# Patient Record
Sex: FEMALE | Race: OTHER | HISPANIC OR LATINO | URBAN - METROPOLITAN AREA
[De-identification: names, ages, dates, MRNs, and addresses within clinical notes are randomized per-mention and may not be internally consistent; named-entity substitution may affect disease eponyms.]

---

## 2020-01-27 ENCOUNTER — INPATIENT (INPATIENT)
Facility: HOSPITAL | Age: 75
LOS: 3 days | Discharge: HOME | DRG: 280 | End: 2020-01-31
Attending: HOSPITALIST | Admitting: STUDENT IN AN ORGANIZED HEALTH CARE EDUCATION/TRAINING PROGRAM
Payer: MEDICAID

## 2020-01-27 VITALS
RESPIRATION RATE: 17 BRPM | SYSTOLIC BLOOD PRESSURE: 109 MMHG | DIASTOLIC BLOOD PRESSURE: 68 MMHG | OXYGEN SATURATION: 95 % | HEART RATE: 145 BPM

## 2020-01-27 DIAGNOSIS — I21.4 NON-ST ELEVATION (NSTEMI) MYOCARDIAL INFARCTION: ICD-10-CM

## 2020-01-27 LAB
ALBUMIN SERPL ELPH-MCNC: 3.5 G/DL — SIGNIFICANT CHANGE UP (ref 3.3–5)
ALBUMIN SERPL ELPH-MCNC: 3.9 G/DL — SIGNIFICANT CHANGE UP (ref 3.3–5)
ALP SERPL-CCNC: 100 U/L — SIGNIFICANT CHANGE UP (ref 40–120)
ALP SERPL-CCNC: 94 U/L — SIGNIFICANT CHANGE UP (ref 40–120)
ALT FLD-CCNC: 16 U/L — SIGNIFICANT CHANGE UP (ref 10–45)
ALT FLD-CCNC: 18 U/L — SIGNIFICANT CHANGE UP (ref 10–45)
ANION GAP SERPL CALC-SCNC: 14 MMOL/L — SIGNIFICANT CHANGE UP (ref 5–17)
ANION GAP SERPL CALC-SCNC: 19 MMOL/L — HIGH (ref 5–17)
APTT BLD: 32.6 SEC — SIGNIFICANT CHANGE UP (ref 27.5–36.3)
AST SERPL-CCNC: 62 U/L — HIGH (ref 10–40)
AST SERPL-CCNC: 77 U/L — HIGH (ref 10–40)
BILIRUB SERPL-MCNC: 1.2 MG/DL — SIGNIFICANT CHANGE UP (ref 0.2–1.2)
BILIRUB SERPL-MCNC: 1.3 MG/DL — HIGH (ref 0.2–1.2)
BLD GP AB SCN SERPL QL: NEGATIVE — SIGNIFICANT CHANGE UP
BUN SERPL-MCNC: 22 MG/DL — SIGNIFICANT CHANGE UP (ref 7–23)
BUN SERPL-MCNC: 26 MG/DL — HIGH (ref 7–23)
CALCIUM SERPL-MCNC: 8.7 MG/DL — SIGNIFICANT CHANGE UP (ref 8.4–10.5)
CALCIUM SERPL-MCNC: 9.2 MG/DL — SIGNIFICANT CHANGE UP (ref 8.4–10.5)
CHLORIDE SERPL-SCNC: 103 MMOL/L — SIGNIFICANT CHANGE UP (ref 96–108)
CHLORIDE SERPL-SCNC: 99 MMOL/L — SIGNIFICANT CHANGE UP (ref 96–108)
CK MB BLD-MCNC: 4.2 % — HIGH (ref 0–3.5)
CK MB CFR SERPL CALC: 7.8 NG/ML — HIGH (ref 0–3.8)
CK SERPL-CCNC: 187 U/L — HIGH (ref 25–170)
CO2 SERPL-SCNC: 18 MMOL/L — LOW (ref 22–31)
CO2 SERPL-SCNC: 21 MMOL/L — LOW (ref 22–31)
CREAT SERPL-MCNC: 1.07 MG/DL — SIGNIFICANT CHANGE UP (ref 0.5–1.3)
CREAT SERPL-MCNC: 1.09 MG/DL — SIGNIFICANT CHANGE UP (ref 0.5–1.3)
GAS PNL BLDV: SIGNIFICANT CHANGE UP
GLUCOSE SERPL-MCNC: 111 MG/DL — HIGH (ref 70–99)
GLUCOSE SERPL-MCNC: 135 MG/DL — HIGH (ref 70–99)
HBA1C BLD-MCNC: 5.4 % — SIGNIFICANT CHANGE UP (ref 4–5.6)
HCT VFR BLD CALC: 37.9 % — SIGNIFICANT CHANGE UP (ref 34.5–45)
HCT VFR BLD CALC: 40.8 % — SIGNIFICANT CHANGE UP (ref 34.5–45)
HGB BLD-MCNC: 12.6 G/DL — SIGNIFICANT CHANGE UP (ref 11.5–15.5)
HGB BLD-MCNC: 13.3 G/DL — SIGNIFICANT CHANGE UP (ref 11.5–15.5)
INR BLD: 2.08 RATIO — HIGH (ref 0.88–1.16)
MAGNESIUM SERPL-MCNC: 1.8 MG/DL — SIGNIFICANT CHANGE UP (ref 1.6–2.6)
MCHC RBC-ENTMCNC: 30.3 PG — SIGNIFICANT CHANGE UP (ref 27–34)
MCHC RBC-ENTMCNC: 30.4 PG — SIGNIFICANT CHANGE UP (ref 27–34)
MCHC RBC-ENTMCNC: 32.6 GM/DL — SIGNIFICANT CHANGE UP (ref 32–36)
MCHC RBC-ENTMCNC: 33.2 GM/DL — SIGNIFICANT CHANGE UP (ref 32–36)
MCV RBC AUTO: 91.3 FL — SIGNIFICANT CHANGE UP (ref 80–100)
MCV RBC AUTO: 92.9 FL — SIGNIFICANT CHANGE UP (ref 80–100)
NRBC # BLD: 0 /100 WBCS — SIGNIFICANT CHANGE UP (ref 0–0)
NRBC # BLD: 0 /100 WBCS — SIGNIFICANT CHANGE UP (ref 0–0)
PHOSPHATE SERPL-MCNC: 3.4 MG/DL — SIGNIFICANT CHANGE UP (ref 2.5–4.5)
PLATELET # BLD AUTO: 226 K/UL — SIGNIFICANT CHANGE UP (ref 150–400)
PLATELET # BLD AUTO: 269 K/UL — SIGNIFICANT CHANGE UP (ref 150–400)
POTASSIUM SERPL-MCNC: 3.6 MMOL/L — SIGNIFICANT CHANGE UP (ref 3.5–5.3)
POTASSIUM SERPL-MCNC: 4 MMOL/L — SIGNIFICANT CHANGE UP (ref 3.5–5.3)
POTASSIUM SERPL-SCNC: 3.6 MMOL/L — SIGNIFICANT CHANGE UP (ref 3.5–5.3)
POTASSIUM SERPL-SCNC: 4 MMOL/L — SIGNIFICANT CHANGE UP (ref 3.5–5.3)
PROT SERPL-MCNC: 6.7 G/DL — SIGNIFICANT CHANGE UP (ref 6–8.3)
PROT SERPL-MCNC: 7.3 G/DL — SIGNIFICANT CHANGE UP (ref 6–8.3)
PROTHROM AB SERPL-ACNC: 24.3 SEC — HIGH (ref 10–12.9)
RBC # BLD: 4.15 M/UL — SIGNIFICANT CHANGE UP (ref 3.8–5.2)
RBC # BLD: 4.39 M/UL — SIGNIFICANT CHANGE UP (ref 3.8–5.2)
RBC # FLD: 14.5 % — SIGNIFICANT CHANGE UP (ref 10.3–14.5)
RBC # FLD: 14.6 % — HIGH (ref 10.3–14.5)
RH IG SCN BLD-IMP: POSITIVE — SIGNIFICANT CHANGE UP
SODIUM SERPL-SCNC: 136 MMOL/L — SIGNIFICANT CHANGE UP (ref 135–145)
SODIUM SERPL-SCNC: 138 MMOL/L — SIGNIFICANT CHANGE UP (ref 135–145)
TROPONIN T, HIGH SENSITIVITY RESULT: 881 NG/L — HIGH (ref 0–51)
TROPONIN T, HIGH SENSITIVITY RESULT: 883 NG/L — HIGH (ref 0–51)
TROPONIN T, HIGH SENSITIVITY RESULT: 960 NG/L — HIGH (ref 0–51)
TSH SERPL-MCNC: 1.01 UIU/ML — SIGNIFICANT CHANGE UP (ref 0.27–4.2)
WBC # BLD: 10.71 K/UL — HIGH (ref 3.8–10.5)
WBC # BLD: 12.45 K/UL — HIGH (ref 3.8–10.5)
WBC # FLD AUTO: 10.71 K/UL — HIGH (ref 3.8–10.5)
WBC # FLD AUTO: 12.45 K/UL — HIGH (ref 3.8–10.5)

## 2020-01-27 PROCEDURE — 70544 MR ANGIOGRAPHY HEAD W/O DYE: CPT | Mod: 26

## 2020-01-27 PROCEDURE — 99223 1ST HOSP IP/OBS HIGH 75: CPT | Mod: GC

## 2020-01-27 PROCEDURE — 70450 CT HEAD/BRAIN W/O DYE: CPT | Mod: 26

## 2020-01-27 PROCEDURE — 71045 X-RAY EXAM CHEST 1 VIEW: CPT | Mod: 26

## 2020-01-27 PROCEDURE — 99291 CRITICAL CARE FIRST HOUR: CPT

## 2020-01-27 RX ORDER — NITROGLYCERIN 6.5 MG
10 CAPSULE, EXTENDED RELEASE ORAL
Qty: 50 | Refills: 0 | Status: DISCONTINUED | OUTPATIENT
Start: 2020-01-27 | End: 2020-01-27

## 2020-01-27 RX ORDER — CHLORHEXIDINE GLUCONATE 213 G/1000ML
1 SOLUTION TOPICAL
Refills: 0 | Status: DISCONTINUED | OUTPATIENT
Start: 2020-01-27 | End: 2020-01-28

## 2020-01-27 RX ORDER — ASPIRIN/CALCIUM CARB/MAGNESIUM 324 MG
325 TABLET ORAL ONCE
Refills: 0 | Status: COMPLETED | OUTPATIENT
Start: 2020-01-27 | End: 2020-01-27

## 2020-01-27 RX ORDER — DILTIAZEM HCL 120 MG
240 CAPSULE, EXT RELEASE 24 HR ORAL DAILY
Refills: 0 | Status: DISCONTINUED | OUTPATIENT
Start: 2020-01-28 | End: 2020-01-31

## 2020-01-27 RX ORDER — ATORVASTATIN CALCIUM 80 MG/1
80 TABLET, FILM COATED ORAL AT BEDTIME
Refills: 0 | Status: DISCONTINUED | OUTPATIENT
Start: 2020-01-27 | End: 2020-01-31

## 2020-01-27 RX ORDER — HEPARIN SODIUM 5000 [USP'U]/ML
INJECTION INTRAVENOUS; SUBCUTANEOUS
Qty: 25000 | Refills: 0 | Status: DISCONTINUED | OUTPATIENT
Start: 2020-01-27 | End: 2020-01-28

## 2020-01-27 RX ORDER — DOCUSATE SODIUM 100 MG
1 CAPSULE ORAL
Qty: 0 | Refills: 0 | DISCHARGE

## 2020-01-27 RX ORDER — DILTIAZEM HCL 120 MG
5 CAPSULE, EXT RELEASE 24 HR ORAL
Qty: 125 | Refills: 0 | Status: DISCONTINUED | OUTPATIENT
Start: 2020-01-27 | End: 2020-01-27

## 2020-01-27 RX ORDER — ASPIRIN/CALCIUM CARB/MAGNESIUM 324 MG
81 TABLET ORAL DAILY
Refills: 0 | Status: DISCONTINUED | OUTPATIENT
Start: 2020-01-28 | End: 2020-01-31

## 2020-01-27 RX ORDER — METOPROLOL TARTRATE 50 MG
12.5 TABLET ORAL ONCE
Refills: 0 | Status: DISCONTINUED | OUTPATIENT
Start: 2020-01-27 | End: 2020-01-27

## 2020-01-27 RX ORDER — METOPROLOL TARTRATE 50 MG
25 TABLET ORAL
Refills: 0 | Status: DISCONTINUED | OUTPATIENT
Start: 2020-01-27 | End: 2020-01-29

## 2020-01-27 RX ORDER — CLOPIDOGREL BISULFATE 75 MG/1
75 TABLET, FILM COATED ORAL DAILY
Refills: 0 | Status: DISCONTINUED | OUTPATIENT
Start: 2020-01-28 | End: 2020-01-29

## 2020-01-27 RX ORDER — OMEPRAZOLE 10 MG/1
1 CAPSULE, DELAYED RELEASE ORAL
Qty: 0 | Refills: 0 | DISCHARGE

## 2020-01-27 RX ADMIN — ATORVASTATIN CALCIUM 80 MILLIGRAM(S): 80 TABLET, FILM COATED ORAL at 21:06

## 2020-01-27 RX ADMIN — Medication 3 MICROGRAM(S)/MIN: at 14:41

## 2020-01-27 RX ADMIN — Medication 325 MILLIGRAM(S): at 20:14

## 2020-01-27 RX ADMIN — HEPARIN SODIUM 650 UNIT(S)/HR: 5000 INJECTION INTRAVENOUS; SUBCUTANEOUS at 20:14

## 2020-01-27 RX ADMIN — Medication 5 MG/HR: at 12:56

## 2020-01-27 NOTE — CONSULT NOTE ADULT - SUBJECTIVE AND OBJECTIVE BOX
HPI:  74 year old female presenting to the ED as a transfer from [place] for cardiac eval, found to have RUE weakness, LKN is 1/26 in the AM    PAST MEDICAL & SURGICAL HISTORY:    FAMILY HISTORY:       General:  Constitutional: Obese Female, appears stated age, in no apparent distress including pain  Head: Normocephalic & atraumatic.  ENT: Patent ear canals, intact TM, mucus membranes moist & pink, neck supple, no lymphadenopathy.   Respiratory: Patent airway. All lung fields are clear to auscultation bilaterally.  Extremities: No cyanosis, clubbing, or edema.  Skin: No rashes, bruising, or discoloration.    Cardiovascular (>2): RRR no murmurs. Carotid pulsations symmetric, no bruits. Normal capillary beds refill, 1-2 seconds or less.     Neurological (>12):  MS: Awake, alert, oriented to person, place, situation, time. Normal affect. Follows all commands.    Language: Speech is clear, fluent with good repetition & comprehension (able to name objects___)    CNs: PERRLA (R = 3mm, L = 3mm). VFF. EOMI no nystagmus, no diplopia. V1-3 intact to LT/pinprick, well developed masseter muscles b/l. No facial asymmetry b/l, full eye closure strength b/l. Hearing grossly normal (rubbing fingers) b/l. Symmetric palate elevation in midline. Gag reflex deferred. Head turning & shoulder shrug intact b/l. Tongue midline, normal movements, no atrophy.    Fundoscopic: pale w/ sharp discs margins No vascular changes.      Motor: Normal muscle bulk & tone. No noticeable tremor or seizure. No pronator drift.              Deltoid	Biceps	Triceps	Wrist	Finger ABd	   R	5	5	5	5	5		5 	  L	5	5	5	5	5		5    	H-Flex	H-Ext	H-ABd	H-ADd	K-Flex	K-Ext	D-Flex	P-Flex  R	5	5	5	5	5	5	5	5 	   L	5	5	5	5	5	5	5	5	     Sensation: Intact to LT/PP/Temp/Vibration/Position b/l throughout.     Cortical: Extinction on DSS (neglect): none    Reflexes:              Biceps(C5)       BR(C6)     Triceps(C7)               Patellar(L4)    Achilles(S1)    Plantar Resp  R	2	          2	             2		        2		    2		Down   L	2	          2	             2		        2		    2		Down     Coordination: intact rapid-alt movements. No dysmetria to FTN/HTS    Gait: Normal Romberg. No postural instability. Normal stance and tandem gait.     LABORATORY:  CBC                       13.3   12.45 )-----------( 269      ( 27 Jan 2020 12:55 )             40.8     Chem 01-27    136  |  99  |  22  ----------------------------<  135<H>  3.6   |  18<L>  |  1.09    Ca    9.2      27 Jan 2020 12:55    TPro  7.3  /  Alb  3.9  /  TBili  1.3<H>  /  DBili  x   /  AST  77<H>  /  ALT  18  /  AlkPhos  100  01-27    LFTs LIVER FUNCTIONS - ( 27 Jan 2020 12:55 )  Alb: 3.9 g/dL / Pro: 7.3 g/dL / ALK PHOS: 100 U/L / ALT: 18 U/L / AST: 77 U/L / GGT: x           Coagulopathy   Lipid Panel   A1c   Cardiac enzymes     U/A HPI:  74 year old Czech speaking female with no known PMHx who  presented to the ED as a transfer from [place] for cardiac eval (troponin 17 per ED). She presented to [place] initially for RUE weakness, was noted by family to have almost fallen out of bed, likely due to RLE weakness, though RUE > RLE weakness. She is able to do all ADLs otherwise. She presented to the ED on a nitro drip and heparin drip, and received cardizem and plavix at [place]. She was also tachycardic to the 160s on arrival. CT head at [place] was reportedly unremarkable. No head trauma recently, no illnesses.   NIHSS 1 (drift), MRS 0    General:  Constitutional: Female, appears stated age, in no apparent distress including pain  Head: Normocephalic & atraumatic.    Neurological (>12):  MS: Awake, alert, oriented to person, place, situation, time. Normal affect. Follows all commands.  Language: Speech is clear, fluent with good repetition & comprehension    CNs: PERRL. EOMI no nystagmus, no diplopia. V1-3 intact to LT. well developed masseter muscles b/l. No facial asymmetry b/l, full eye closure strength b/l.     Motor: Normal muscle bulk & tone. No noticeable tremor or seizure. + RUE pronator drift              Deltoid	Biceps	Triceps     R	4	4	4	4		  L	5	5	5	5    	H-Flex	H-ExtK-Flex	K-Ext	D-Flex	P-Flex  R	5	5	5	5	5	5	   L	5	5	5	5	5	5		     Sensation: Intact to LT throughout.     Coordination:  + dysmetria in RUE  Gait: deferred    LABORATORY:  CBC                       13.3   12.45 )-----------( 269      ( 27 Jan 2020 12:55 )             40.8     Chem 01-27    136  |  99  |  22  ----------------------------<  135<H>  3.6   |  18<L>  |  1.09    Ca    9.2      27 Jan 2020 12:55    TPro  7.3  /  Alb  3.9  /  TBili  1.3<H>  /  DBili  x   /  AST  77<H>  /  ALT  18  /  AlkPhos  100  01-27    LFTs LIVER FUNCTIONS - ( 27 Jan 2020 12:55 )  Alb: 3.9 g/dL / Pro: 7.3 g/dL / ALK PHOS: 100 U/L / ALT: 18 U/L / AST: 77 U/L / GGT: x

## 2020-01-27 NOTE — ED ADULT NURSE REASSESSMENT NOTE - NS ED NURSE REASSESS COMMENT FT1
Patient was on continued drip of Nitro and Heparin from OSH. Heparin stopped at 14:40 as per MD instructions that came from Neuro. Will continue to hold until further notice.

## 2020-01-27 NOTE — H&P ADULT - NSHPPHYSICALEXAM_GEN_ALL_CORE
PHYSICAL EXAM:  Constitutional: WAF  Respiratory: WNL  Cardiovascular: S1, S2 WNL  Gastrointestinal: abd soft, NT  Extremities: warm, dry  Vascular: + DP/PT  Neurological: A and O x 4, RUE weakness   Skin: dry and intact  Psychiatric: normal affect

## 2020-01-27 NOTE — ED PROVIDER NOTE - OBJECTIVE STATEMENT
Pt is a 75 y/o female with PMH gastritis, AF, HTN, aortic anuerysm ( last measured at 4.5), CAD is transferred from  for NSTEMI and CCU evaluation. Pt presented 1/26 to  with R arm weakness and leg weakness, was admitted, developed AF with RVR and chest pain. Had elevated troponin to 17. CTA neg for PE. Pt transferred on NTG and Heparin drip. Pt previously on cardizem drip which was d/c-ed prior to transfer.

## 2020-01-27 NOTE — CONSULT NOTE ADULT - ASSESSMENT
74 year old Albanian speaking female with no known PMHx who  presented to the ED as a transfer from [place] for cardiac eval (troponin 17 per ED). She presented to [place] initially for RUE weakness, was noted by family to have almost fallen out of bed, likely due to RLE weakness, though RUE > RLE weakness. She is able to do all ADLs otherwise. She presented to the ED on a nitro drip and heparin drip, and received cardizem and plavix at [place]. She was also tachycardic to the 160s on arrival. CT head at [place] was reportedly unremarkable. No head trauma recently, no illnesses.   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

## 2020-01-27 NOTE — H&P ADULT - NSICDXPASTMEDICALHX_GEN_ALL_CORE_FT
PAST MEDICAL HISTORY:  Atrial fibrillation     CAD (coronary artery disease)     Gastritis     HLD (hyperlipidemia)

## 2020-01-27 NOTE — H&P ADULT - ASSESSMENT
77 year old F w/ pmh of HTN, HLD, aortic aneurysm, AF who presented to [facility] w/ HTN, Af w/ RVR and new RUE weakness concerning for stroke complicated by chest pain w/ positive EKGs consistent w/ NSTEMI    Stroke  - Continue DAPT  - MRI brain  -PT/OT  - Neuro check Q 45    NSTEMI  - ECHO revealing normal LV, likely demand in the setting of AF w/ RVR  - Continue to trend CE  - Aspirin load, continue plavix and heparin, ok with Neuro    Af w/ RVR  - Continue oral cardizem and BB  Now in NSr

## 2020-01-27 NOTE — ED ADULT NURSE NOTE - OBJECTIVE STATEMENT
74y female arrived to ED as transfer from [facility] for cardiac evaluation. Patient arrived to [facility] with Nitro drip set at 10mcg/min and heparin drip set at 650 units/hour. As per EMS, patient did NOT receive Heparin bolus, ASA, Brilinta PTA. Patient received Cardizem (unknown amount), Plavix 675mg PTA according to EMS. Patient A&Ox4, ambulatory at baseline, lives at home with family. Patient tachycardic 160s on arrival, O2 is 92% on room air, improved to 95% with 2L nasal canula. 74y female arrived to ED as transfer from [facility] for cardiac evaluation. Patient arrived to [facility] with Nitro drip set at 10mcg/min and heparin drip set at 650 units/hour. As per EMS, patient did NOT receive Heparin bolus, ASA, Brilinta PTA. Patient received Cardizem (unknown amount), Plavix 675mg PTA according to EMS. Patient A&Ox4, ambulatory at baseline, lives at home with family. Patient tachycardic 160s on arrival, O2 is 92% on room air, improved to 95% with 2L nasal canula. Patient denies pain at this time. Primarily Ukrainian speaking.

## 2020-01-27 NOTE — H&P ADULT - HISTORY OF PRESENT ILLNESS
74 year old Azeri speaking female with PMHx of gastritis, AF, HTN, aortic anuerysm ( last measured at 4.5), CAD  who presented from home on 1/26 with R arm weakness while eating breakfast and her "legs giving out". Pt also had elevated BP, nausea, vomiting and epigastric abdominal pain. In the ED a CTH was negative and INR 2.2 therapeutic. Neurology came to evaluate the patient who stated no TPA was necessary for RUE weakness and suspicion for CVA. On  1/27 pt developed  AF w/ RVR with HR in the 200s and was  complaining of substernal chest pain w/ stable blood pressure in the 130s. Cardizem gtt was started and nitro was given x 3.  EKG showed TWI in V3, V4. CT angio  was negative for aortic dissection. She was found to have elevated Troponin to 17 and was transferred to [facility] for possible cardiac cath She presented to the ED on a nitro drip and heparin drip, and received cardizem and plavix at [place]. She was also tachycardic to the 160s on arrival. CT head at [place] was reportedly unremarkable however the repeat head CT showed possible L lacunar infarct.     Upon admission to CCU 2  pt had no more chest pain and HR was sinus rhythm 80s.

## 2020-01-27 NOTE — ED ADULT NURSE NOTE - NSIMPLEMENTINTERV_GEN_ALL_ED
Implemented All Fall with Harm Risk Interventions:  [place] to call system. Call bell, personal items and telephone within reach. Instruct patient to call for assistance. Room bathroom lighting operational. Non-slip footwear when patient is off stretcher. Physically safe environment: no spills, clutter or unnecessary equipment. Stretcher in lowest position, wheels locked, appropriate side rails in place. Provide visual cue, wrist band, yellow gown, etc. Monitor gait and stability. Monitor for mental status changes and reorient to person, place, and time. Review medications for side effects contributing to fall risk. Reinforce activity limits and safety measures with patient and family. Provide visual clues: red socks.

## 2020-01-27 NOTE — ED PROVIDER NOTE - CRITICAL CARE PROVIDED
consultation with other physicians/consult w/ pt's family directly relating to pts condition/direct patient care (not related to procedure)/documentation/additional history taking

## 2020-01-27 NOTE — CONSULT NOTE ADULT - ATTENDING COMMENTS
Impression:? Left hemispheric dysfunction likely deep arterial territory involvement based on neurological exam. I agree with the above assessment and plan per resident note and recommend MRI brain without contrast. If patient is on anticoagulation, dual antiplatelet is not necessary from stroke prevention standpoint, however would defer decision to cardiology/medical team.

## 2020-01-27 NOTE — CHART NOTE - NSCHARTNOTEFT_GEN_A_CORE
====================  CCU MIDNIGHT ROUNDS  ====================    BRADEN MORA  [id number]    ====================  SUMMARY: HPI:  74 year old Setswana speaking female with PMHx of gastritis, AF, HTN, aortic anuerysm ( last measured at 4.5), CAD  who presented from home on 1/26 with R arm weakness while eating breakfast and her "legs giving out". Pt also had elevated BP, nausea, vomiting and epigastric abdominal pain. In the ED a CTH was negative and INR 2.2 therapeutic. Neurology came to evaluate the patient who stated no TPA was necessary for RUE weakness and suspicion for CVA. On  1/27 pt developed  AF w/ RVR with HR in the 200s and was  complaining of substernal chest pain w/ stable blood pressure in the 130s. Cardizem gtt was started and nitro was given x 3.  EKG showed TWI in V3, V4. CT angio  was negative for aortic dissection. She was found to have elevated Troponin to 17 and was transferred to [facility] for possible cardiac cath She presented to the ED on a nitro drip and heparin drip, and received cardizem and plavix at [place]. She was also tachycardic to the 160s on arrival. CT head at [place] was reportedly unremarkable however the repeat head CT showed possible L lacunar infarct.     Upon admission to CCU 2  pt had no more chest pain and HR was sinus rhythm 80s. (27 Jan 2020 17:02)    ====================        ====================  NEW EVENTS:  MRA head non-con to be completed tonight.   Remains CP free, no complaints. No neuro changes.   ====================        ====================  VITALS (Last 12 hrs):  ====================    T(C): 36.9 (01-27-20 @ 18:22), Max: 37 (01-27-20 @ 13:17)  HR: 102 (01-27-20 @ 21:00) (71 - 145)  BP: 132/66 (01-27-20 @ 21:00) (82/51 - 132/66)  BP(mean): 91 (01-27-20 @ 21:00) (61 - 91)  RR: 21 (01-27-20 @ 21:00) (17 - 31)  SpO2: 98% (01-27-20 @ 21:00) (93% - 98%)      TELEMETRY: sinus         I&O's Summary    27 Jan 2020 07:01  -  27 Jan 2020 22:01  --------------------------------------------------------  IN: 6.5 mL / OUT: 0 mL / NET: 6.5 mL        ====================  PLAN:  # CVA  - head CT suspicious for left lacunar infarct  - neuro checks q4  - heparin gtt and dapt cleared by neuro  - MRA head non con pending   - PT OT evals     # NSTEMI  - likely demand in the setting of CVA  - continue heparin gtt and DAPT (ok w/ neuro)  - cont statin   - cont home BB   - trend CE and EKGs   - poss ischemic workup pending clinical course   - TTE revealing preserved EF, no WMAs, no PFO, normal RVSF     # Afib   - hold coumadin, cont heparin gtt  - cont home BB and CCB   - monitor lytes   ====================    HEALTH ISSUES - PROBLEM Dx:      Taya Ruiz, CCU PA #[id number]

## 2020-01-27 NOTE — H&P ADULT - NSHPREVIEWOFSYSTEMS_GEN_ALL_CORE
REVIEW OF SYSTEMS  General:	denies   Skin/Breast:	denies  Ophthalmologic:	denies  ENMT:	denies  Respiratory and Thorax:	denies  Cardiovascular:	S1,S2 WNL   Gastrointestinal:	denies  Genitourinary:	denies  Musculoskeletal:	denies  Neurological:	denies  Psychiatric:	denies  Hematology/Lymphatics:	denies  Endocrine:	denies  Allergic/Immunologic:	denies

## 2020-01-27 NOTE — ED PROVIDER NOTE - CLINICAL SUMMARY MEDICAL DECISION MAKING FREE TEXT BOX
JORGE ALBERTO Mora MD: Pt is a 73 y/o female with PMH gastritis, AF, HTN, aortic anuerysm ( last measured at 4.5), CAD is transferred from  for NSTEMI and rapid AFib for CCU eval. Plan: repeat labs including cardiac labs, ekg, cxr. Will restart cardizem drip as pt is in rapid AFib with RVR. D/w Cardiology who recommends Neuro consult given recent CVA to see whether pt can be placed on blood thinners for possible cardiac cath. Neuro and full Cards consults appreciated

## 2020-01-27 NOTE — ED PROVIDER NOTE - SEVERE SEPSIS ALERT DETAILS
pt has AF, demand ischemia, HR elevated 2/2 this- will be admitted for NSTEMI, also with recent stroke

## 2020-01-28 ENCOUNTER — TRANSCRIPTION ENCOUNTER (OUTPATIENT)
Age: 75
End: 2020-01-28

## 2020-01-28 DIAGNOSIS — I63.9 CEREBRAL INFARCTION, UNSPECIFIED: ICD-10-CM

## 2020-01-28 DIAGNOSIS — I48.91 UNSPECIFIED ATRIAL FIBRILLATION: ICD-10-CM

## 2020-01-28 DIAGNOSIS — I21.4 NON-ST ELEVATION (NSTEMI) MYOCARDIAL INFARCTION: ICD-10-CM

## 2020-01-28 LAB
ALBUMIN SERPL ELPH-MCNC: 3.5 G/DL — SIGNIFICANT CHANGE UP (ref 3.3–5)
ALP SERPL-CCNC: 91 U/L — SIGNIFICANT CHANGE UP (ref 40–120)
ALT FLD-CCNC: 14 U/L — SIGNIFICANT CHANGE UP (ref 10–45)
ANION GAP SERPL CALC-SCNC: 13 MMOL/L — SIGNIFICANT CHANGE UP (ref 5–17)
APTT BLD: 108.8 SEC — HIGH (ref 27.5–36.3)
APTT BLD: 59.1 SEC — HIGH (ref 27.5–36.3)
APTT BLD: 95.8 SEC — HIGH (ref 27.5–36.3)
AST SERPL-CCNC: 54 U/L — HIGH (ref 10–40)
BILIRUB SERPL-MCNC: 1 MG/DL — SIGNIFICANT CHANGE UP (ref 0.2–1.2)
BUN SERPL-MCNC: 30 MG/DL — HIGH (ref 7–23)
CALCIUM SERPL-MCNC: 8.9 MG/DL — SIGNIFICANT CHANGE UP (ref 8.4–10.5)
CHLORIDE SERPL-SCNC: 102 MMOL/L — SIGNIFICANT CHANGE UP (ref 96–108)
CHOLEST SERPL-MCNC: 238 MG/DL — HIGH (ref 10–199)
CK MB BLD-MCNC: 3.4 % — SIGNIFICANT CHANGE UP (ref 0–3.5)
CK MB CFR SERPL CALC: 4.9 NG/ML — HIGH (ref 0–3.8)
CK SERPL-CCNC: 145 U/L — SIGNIFICANT CHANGE UP (ref 25–170)
CO2 SERPL-SCNC: 22 MMOL/L — SIGNIFICANT CHANGE UP (ref 22–31)
CREAT SERPL-MCNC: 1.22 MG/DL — SIGNIFICANT CHANGE UP (ref 0.5–1.3)
GLUCOSE SERPL-MCNC: 112 MG/DL — HIGH (ref 70–99)
HCT VFR BLD CALC: 38.2 % — SIGNIFICANT CHANGE UP (ref 34.5–45)
HDLC SERPL-MCNC: 59 MG/DL — SIGNIFICANT CHANGE UP
HGB BLD-MCNC: 12.7 G/DL — SIGNIFICANT CHANGE UP (ref 11.5–15.5)
INR BLD: 2.11 RATIO — HIGH (ref 0.88–1.16)
LACTATE SERPL-SCNC: 1.8 MMOL/L — SIGNIFICANT CHANGE UP (ref 0.7–2)
LIPID PNL WITH DIRECT LDL SERPL: 160 MG/DL — HIGH
MAGNESIUM SERPL-MCNC: 1.9 MG/DL — SIGNIFICANT CHANGE UP (ref 1.6–2.6)
MCHC RBC-ENTMCNC: 30.4 PG — SIGNIFICANT CHANGE UP (ref 27–34)
MCHC RBC-ENTMCNC: 33.2 GM/DL — SIGNIFICANT CHANGE UP (ref 32–36)
MCV RBC AUTO: 91.4 FL — SIGNIFICANT CHANGE UP (ref 80–100)
NRBC # BLD: 0 /100 WBCS — SIGNIFICANT CHANGE UP (ref 0–0)
PHOSPHATE SERPL-MCNC: 2.9 MG/DL — SIGNIFICANT CHANGE UP (ref 2.5–4.5)
PLATELET # BLD AUTO: 227 K/UL — SIGNIFICANT CHANGE UP (ref 150–400)
POTASSIUM SERPL-MCNC: 3.9 MMOL/L — SIGNIFICANT CHANGE UP (ref 3.5–5.3)
POTASSIUM SERPL-SCNC: 3.9 MMOL/L — SIGNIFICANT CHANGE UP (ref 3.5–5.3)
PROT SERPL-MCNC: 6.7 G/DL — SIGNIFICANT CHANGE UP (ref 6–8.3)
PROTHROM AB SERPL-ACNC: 24.6 SEC — HIGH (ref 10–12.9)
RBC # BLD: 4.18 M/UL — SIGNIFICANT CHANGE UP (ref 3.8–5.2)
RBC # FLD: 14.6 % — HIGH (ref 10.3–14.5)
SODIUM SERPL-SCNC: 137 MMOL/L — SIGNIFICANT CHANGE UP (ref 135–145)
TOTAL CHOLESTEROL/HDL RATIO MEASUREMENT: 4 RATIO — SIGNIFICANT CHANGE UP (ref 3.3–7.1)
TRIGL SERPL-MCNC: 94 MG/DL — SIGNIFICANT CHANGE UP (ref 10–149)
TROPONIN T, HIGH SENSITIVITY RESULT: 912 NG/L — HIGH (ref 0–51)
WBC # BLD: 9.94 K/UL — SIGNIFICANT CHANGE UP (ref 3.8–10.5)
WBC # FLD AUTO: 9.94 K/UL — SIGNIFICANT CHANGE UP (ref 3.8–10.5)

## 2020-01-28 PROCEDURE — 99233 SBSQ HOSP IP/OBS HIGH 50: CPT | Mod: GC

## 2020-01-28 PROCEDURE — 93010 ELECTROCARDIOGRAM REPORT: CPT

## 2020-01-28 PROCEDURE — 99223 1ST HOSP IP/OBS HIGH 75: CPT

## 2020-01-28 PROCEDURE — 70551 MRI BRAIN STEM W/O DYE: CPT | Mod: 26

## 2020-01-28 RX ORDER — MAGNESIUM SULFATE 500 MG/ML
1 VIAL (ML) INJECTION ONCE
Refills: 0 | Status: COMPLETED | OUTPATIENT
Start: 2020-01-28 | End: 2020-01-28

## 2020-01-28 RX ORDER — HEPARIN SODIUM 5000 [USP'U]/ML
500 INJECTION INTRAVENOUS; SUBCUTANEOUS
Qty: 25000 | Refills: 0 | Status: DISCONTINUED | OUTPATIENT
Start: 2020-01-28 | End: 2020-01-30

## 2020-01-28 RX ORDER — POTASSIUM CHLORIDE 20 MEQ
20 PACKET (EA) ORAL ONCE
Refills: 0 | Status: COMPLETED | OUTPATIENT
Start: 2020-01-28 | End: 2020-01-28

## 2020-01-28 RX ORDER — HEPARIN SODIUM 5000 [USP'U]/ML
3200 INJECTION INTRAVENOUS; SUBCUTANEOUS ONCE
Refills: 0 | Status: DISCONTINUED | OUTPATIENT
Start: 2020-01-28 | End: 2020-01-30

## 2020-01-28 RX ORDER — HEPARIN SODIUM 5000 [USP'U]/ML
3200 INJECTION INTRAVENOUS; SUBCUTANEOUS EVERY 6 HOURS
Refills: 0 | Status: DISCONTINUED | OUTPATIENT
Start: 2020-01-28 | End: 2020-01-30

## 2020-01-28 RX ADMIN — Medication 25 MILLIGRAM(S): at 11:02

## 2020-01-28 RX ADMIN — Medication 81 MILLIGRAM(S): at 13:21

## 2020-01-28 RX ADMIN — CHLORHEXIDINE GLUCONATE 1 APPLICATION(S): 213 SOLUTION TOPICAL at 05:43

## 2020-01-28 RX ADMIN — HEPARIN SODIUM 650 UNIT(S)/HR: 5000 INJECTION INTRAVENOUS; SUBCUTANEOUS at 03:44

## 2020-01-28 RX ADMIN — ATORVASTATIN CALCIUM 80 MILLIGRAM(S): 80 TABLET, FILM COATED ORAL at 21:05

## 2020-01-28 RX ADMIN — Medication 100 GRAM(S): at 05:52

## 2020-01-28 RX ADMIN — CLOPIDOGREL BISULFATE 75 MILLIGRAM(S): 75 TABLET, FILM COATED ORAL at 13:21

## 2020-01-28 RX ADMIN — HEPARIN SODIUM 500 UNIT(S)/HR: 5000 INJECTION INTRAVENOUS; SUBCUTANEOUS at 19:41

## 2020-01-28 RX ADMIN — Medication 240 MILLIGRAM(S): at 05:53

## 2020-01-28 RX ADMIN — HEPARIN SODIUM 0 UNIT(S)/HR: 5000 INJECTION INTRAVENOUS; SUBCUTANEOUS at 14:54

## 2020-01-28 RX ADMIN — Medication 25 MILLIGRAM(S): at 00:52

## 2020-01-28 RX ADMIN — Medication 20 MILLIEQUIVALENT(S): at 05:53

## 2020-01-28 NOTE — PHYSICAL THERAPY INITIAL EVALUATION ADULT - DISCHARGE DISPOSITION, PT EVAL
home/home w/ assist/home w/ home PT/home with home PT for improved strength balance & endurance for activity, recommend RW for ambulation at this time, supervision/assist from family as needed

## 2020-01-28 NOTE — PHYSICAL THERAPY INITIAL EVALUATION ADULT - ADDITIONAL COMMENTS
Pt resides in apt with son, 6 steps to enter, one level within. PTA independent with mobility and ADL's, owns no DME.

## 2020-01-28 NOTE — OCCUPATIONAL THERAPY INITIAL EVALUATION ADULT - PRECAUTIONS/LIMITATIONS, REHAB EVAL
On  1/27 pt developed  AF w/ RVR with HR in the 200s and was  complaining of substernal chest pain w/ stable blood pressure in the 130s. Cardizem gtt was started and nitro was given x 3.  EKG showed TWI in V3, V4. CT angio  was negative for aortic dissection. She was found to have elevated Troponin to 17 and was transferred to [facility] for possible cardiac cath She presented to the ED on a nitro drip and heparin drip, and received cardizem and plavix at [place]. She was also tachycardic to the 160s on arrival. CT head at [place] was reportedly unremarkable however the repeat head CT showed possible L lacunar infarct. Upon admission to CCU 2  pt had no more chest pain and HR was sinus rhythm 80s. fall precautions/On  1/27 pt developed  AF w/ RVR with HR in the 200s and was  complaining of substernal chest pain w/ stable blood pressure in the 130s. Cardizem gtt was started and nitro was given x 3.  EKG showed TWI in V3, V4. CT angio  was negative for aortic dissection. She was found to have elevated Troponin to 17 and was transferred to [facility] for possible cardiac cath She presented to the ED on a nitro drip and heparin drip, and received cardizem and plavix at [place]. She was also tachycardic to the 160s on arrival. CT head at [place] was reportedly unremarkable however the repeat head CT showed possible L lacunar infarct. Upon admission to CCU 2  pt had no more chest pain and HR was sinus rhythm 80s.

## 2020-01-28 NOTE — CHART NOTE - NSCHARTNOTEFT_GEN_A_CORE
HPI:  74 year old Khmer speaking female with PMHx of gastritis, AF, HTN, aortic anuerysm ( last measured at 4.5), CAD  who presented from home on 1/26 with R arm weakness while eating breakfast and her "legs giving out". Pt also had elevated BP, nausea, vomiting and epigastric abdominal pain. In the ED a CTH was negative and INR 2.2 therapeutic. Neurology came to evaluate the patient who stated no TPA was necessary for RUE weakness and suspicion for CVA. On  1/27 pt developed  AF w/ RVR with HR in the 200s and was  complaining of substernal chest pain w/ stable blood pressure in the 130s. Cardizem gtt was started and nitro was given x 3.  EKG showed TWI in V3, V4. CT angio  was negative for aortic dissection. She was found to have elevated Troponin to 17 and was transferred to [facility] for possible cardiac cath She presented to the ED on a nitro drip and heparin drip, and received cardizem and plavix at [place]. She was also tachycardic to the 160s on arrival. CT head at [place] was reportedly unremarkable however the repeat head CT showed possible L lacunar infarct.   Upon admission to CCU 2  pt had no more chest pain and HR was sinus rhythm 80s. (27 Jan 2020 17:02)  CCU Course: pt was continued on hep gtt for afib, HR 40's- 50's, asymptomatic, c/w ASA, Plavix, Lipitor, lopressor on hold due to bradycardia HR 40's-50's, neurology team following, s/p MRI pending results and clearance for eventual cath.     ICU Vital Signs Last 24 Hrs  T(C): 37.1 (28 Jan 2020 19:00), Max: 37.2 (28 Jan 2020 06:00)  T(F): 98.8 (28 Jan 2020 19:00), Max: 98.9 (28 Jan 2020 06:00)  HR: 52 (28 Jan 2020 22:00) (52 - 148)  BP: 102/65 (28 Jan 2020 22:00) (81/59 - 141/81)  BP(mean): 78 (28 Jan 2020 22:00) (65 - 102)  RR: 22 (28 Jan 2020 22:00) (22 - 42)  SpO2: 96% (28 Jan 2020 22:00) (92% - 98%)    TELEMETRY: afib 40's - 50's     MEDICATIONS  (STANDING):  aspirin enteric coated 81 milliGRAM(s) Oral daily  atorvastatin 80 milliGRAM(s) Oral at bedtime  chlorhexidine 4% Liquid 1 Application(s) Topical <User Schedule>  clopidogrel Tablet 75 milliGRAM(s) Oral daily  diltiazem    milliGRAM(s) Oral daily  heparin  Infusion. 500 Unit(s)/Hr (5 mL/Hr) IV Continuous <Continuous>  heparin  Injectable 3200 Unit(s) IV Push once  metoprolol tartrate 25 milliGRAM(s) Oral two times a day    MEDICATIONS  (PRN):  heparin  Injectable 3200 Unit(s) IV Push every 6 hours PRN For aPTT less than 40      FOLLOW:  #neuro  CVA, lacunar infarct dx at [facility]   - neuro checks Q4hrs  - s/p MRI Head pending results    #card  NSTEMI (non-ST elevated myocardial infarction).   - c/w Heparin gtt  - c/w ASA, Plavix, lipitor   - metoprolol pm dose held due to HR 40's-50's   - s/p MRI, pending results, neuro clearance for cath     Atrial fibrillation  - c/w hep gtt   - c/w Diltiazem

## 2020-01-28 NOTE — OCCUPATIONAL THERAPY INITIAL EVALUATION ADULT - PERTINENT HX OF CURRENT PROBLEM, REHAB EVAL
73 yo F with PMHx of gastritis, AF, HTN, aortic anuerysm ( last measured at 4.5), CAD  who presented from home on 1/26 with R arm weakness while eating breakfast and her "legs giving out". Pt also had elevated BP, nausea, vomiting and epigastric abdominal pain. In the ED a CTH was negative and INR 2.2 therapeutic. Neurology came to evaluate the patient who stated no TPA was necessary for RUE weakness and suspicion for CVA.

## 2020-01-28 NOTE — PROGRESS NOTE ADULT - SUBJECTIVE AND OBJECTIVE BOX
====================  CCU MIDNIGHT ROUNDS  ====================    BRADEN MORA  [id number]  Patient is a 74y old  Female who presents with a chief complaint of Transferred for NSTEMI (28 Jan 2020 07:25)      ====================  SUMMARY:  ====================        ====================  NEW EVENTS:  ====================        ====================  VITALS (Last 12 hrs):  ====================    T(C): 36.7 (01-28-20 @ 16:00), Max: 36.7 (01-28-20 @ 12:00)  T(F): 98 (01-28-20 @ 16:00), Max: 98 (01-28-20 @ 12:00)  HR: 56 (01-28-20 @ 19:00) (56 - 86)  BP: 102/58 (01-28-20 @ 19:00) (102/58 - 141/81)  BP(mean): 74 (01-28-20 @ 19:00) (74 - 102)  ABP: --  ABP(mean): --  RR: 22 (01-28-20 @ 19:00) (22 - 42)  SpO2: 92% (01-28-20 @ 19:00) (92% - 98%)  Wt(kg): --  CVP(mm Hg): --  CVP(cm H2O): --  CO: --  CI: --  PA: --  PA(mean): --  PCWP: --  SVR: --  PVR: --    I&O's Summary    27 Jan 2020 07:01  -  28 Jan 2020 07:00  --------------------------------------------------------  IN: 278 mL / OUT: 400 mL / NET: -122 mL    28 Jan 2020 07:01  -  28 Jan 2020 20:49  --------------------------------------------------------  IN: 480.1 mL / OUT: 1100 mL / NET: -619.9 mL            ====================  NEW LABS:  ====================                          12.7   9.94  )-----------( 227      ( 28 Jan 2020 02:47 )             38.2     01-28    137  |  102  |  30<H>  ----------------------------<  112<H>  3.9   |  22  |  1.22    Ca    8.9      28 Jan 2020 02:47  Phos  2.9     01-28  Mg     1.9     01-28    TPro  6.7  /  Alb  3.5  /  TBili  1.0  /  DBili  x   /  AST  54<H>  /  ALT  14  /  AlkPhos  91  01-28    PT/INR - ( 28 Jan 2020 02:47 )   PT: 24.6 sec;   INR: 2.11 ratio         PTT - ( 28 Jan 2020 18:30 )  PTT:95.8 sec  Creatine Kinase, Serum: 145 U/L (01-28-20 @ 02:47)    CKMB Units: 4.9 ng/mL (01-28 @ 02:47)          ====================  PLAN:  ====================  #neuro  #card  #GI  #  #ID  #discharge planning ====================  CCU MIDNIGHT ROUNDS  ====================    BRADEN MORA  [id number]  Patient is a 74y old  Female who presents with a chief complaint of Transferred for NSTEMI (28 Jan 2020 07:25)  ====================  SUMMARY:  74 year old Andorran speaking female with PMHx of gastritis, AF, HTN, aortic anuerysm ( last measured at 4.5), CAD  who presented from home on 1/26 with R arm weakness while eating breakfast and her "legs giving out". Pt also had elevated BP, nausea, vomiting and epigastric abdominal pain. In the ED a CTH was negative and INR 2.2 therapeutic. Neurology came to evaluate the patient who stated no TPA was necessary for RUE weakness and suspicion for CVA. On  1/27 pt developed  AF w/ RVR with HR in the 200s and was  complaining of substernal chest pain w/ stable blood pressure in the 130s. Cardizem gtt was started and nitro was given x 3.  EKG showed TWI in V3, V4. CT angio  was negative for aortic dissection. She was found to have elevated Troponin to 17 and was transferred to [facility] for possible cardiac cath She presented to the ED on a nitro drip and heparin drip, and [facility]. She was also tachycardic to the 160s on arrival. CT head at [place] was reportedly unremarkable however the repeat head CT showed possible L lacunar infarct.   Upon admission to CCU 2  pt had no more chest pain and HR was sinus rhythm 80s. (27 Jan 2020 17:02)  ====================  ====================  NEW EVENTS: no acute events overnight   ====================  ====================  VITALS (Last 12 hrs):  ====================  T(C): 36.7 (01-28-20 @ 16:00), Max: 36.7 (01-28-20 @ 12:00)  T(F): 98 (01-28-20 @ 16:00), Max: 98 (01-28-20 @ 12:00)  HR: 56 (01-28-20 @ 19:00) (56 - 86)  BP: 102/58 (01-28-20 @ 19:00) (102/58 - 141/81)  BP(mean): 74 (01-28-20 @ 19:00) (74 - 102)  RR: 22 (01-28-20 @ 19:00) (22 - 42)  SpO2: 92% (01-28-20 @ 19:00) (92% - 98%)  I&O's Summary  27 Jan 2020 07:01  -  28 Jan 2020 07:00  --------------------------------------------------------  IN: 278 mL / OUT: 400 mL / NET: -122 mL    28 Jan 2020 07:01  -  28 Jan 2020 20:49  --------------------------------------------------------  IN: 480.1 mL / OUT: 1100 mL / NET: -619.9 mL    ====================  NEW LABS:  ====================                          12.7   9.94  )-----------( 227      ( 28 Jan 2020 02:47 )             38.2     01-28    137  |  102  |  30<H>  ----------------------------<  112<H>  3.9   |  22  |  1.22    Ca    8.9      28 Jan 2020 02:47  Phos  2.9     01-28  Mg     1.9     01-28    TPro  6.7  /  Alb  3.5  /  TBili  1.0  /  DBili  x   /  AST  54<H>  /  ALT  14  /  AlkPhos  91  01-28    PT/INR - ( 28 Jan 2020 02:47 )   PT: 24.6 sec;   INR: 2.11 ratio         PTT - ( 28 Jan 2020 18:30 )  PTT:95.8 sec  Creatine Kinase, Serum: 145 U/L (01-28-20 @ 02:47)    CKMB Units: 4.9 ng/mL (01-28 @ 02:47)    ====================  PLAN:  ====================  #neuro  CVA, lacunar infarct dx at [facility]   - neuro checks Q4hrs  - s/p MRI Head pending results  - neuro following     #card  NSTEMI (non-ST elevated myocardial infarction).   - c/w Heparin gtt  - c/w ASA, Plavix, metoprolol, lipitor   - c/w DASH Diet  - s/p MRI, pending results, neuro clearance for cath     Atrial fibrillation  - CHADSVASC 7  - c/w hep gtt   - c/w Diltiazem

## 2020-01-28 NOTE — DISCHARGE NOTE NURSING/CASE MANAGEMENT/SOCIAL WORK - NSDCPEPTSTRK_GEN_ALL_CORE
Stroke warning signs and symptoms/Signs and symptoms of stroke/Call 911 for stroke/Need for follow up after discharge/Prescribed medications/Risk factors for stroke/Stroke support groups for patients, families, and friends/Stroke education booklet

## 2020-01-28 NOTE — PROGRESS NOTE ADULT - SUBJECTIVE AND OBJECTIVE BOX
CHIEF COMPLAINT::    INTERVAL HISTORY:    REVIEW OF SYSTEMS: all others negative    MEDICATIONS  (STANDING):  aspirin enteric coated 81 milliGRAM(s) Oral daily  atorvastatin 80 milliGRAM(s) Oral at bedtime  chlorhexidine 4% Liquid 1 Application(s) Topical <User Schedule>  clopidogrel Tablet 75 milliGRAM(s) Oral daily  diltiazem    milliGRAM(s) Oral daily  heparin  Infusion.  Unit(s)/Hr (6.5 mL/Hr) IV Continuous <Continuous>  metoprolol tartrate 25 milliGRAM(s) Oral two times a day    MEDICATIONS  (PRN):      Objective:  Vital Signs Last 24 Hrs  T(C): 37.2 (2020 06:00), Max: 37.2 (2020 06:00)  T(F): 98.9 (2020 06:00), Max: 98.9 (2020 06:00)  HR: 114 (2020 07:00) (71 - 148)  BP: 120/87 (2020 07:00) (81/59 - 132/66)  BP(mean): 97 (2020 07:00) (61 - 100)  RR: 30 (2020 07:00) (17 - 33)  SpO2: 97% (2020 07:00) (92% - 98%)  ICU Vital Signs Last 24 Hrs  T(C): 37.2 (2020 06:00), Max: 37.2 (2020 06:00)  T(F): 98.9 (2020 06:00), Max: 98.9 (2020 06:00)  HR: 114 (2020 07:00) (71 - 148)  BP: 120/87 (2020 07:00) (81/59 - 132/66)  BP(mean): 97 (2020 07:00) (61 - 100)  ABP: --  ABP(mean): --  RR: 30 (2020 07:00) (17 - 33)  SpO2: 97% (2020 07:00) (92% - 98%)      Adult Advanced Hemodynamics Last 24 Hrs  CVP(mm Hg): --  CVP(cm H2O): --  CO: --  CI: --  PA: --  PA(mean): --  PCWP: --  SVR: --  SVRI: --  PVR: --  PVRI: --       @ 07: @ 07:00  --------------------------------------------------------  IN: 278 mL / OUT: 400 mL / NET: -122 mL     @ 07: @ 07:26  --------------------------------------------------------  IN: 5.6 mL / OUT: 0 mL / NET: 5.6 mL      Daily Height in cm: 154.94 (2020 18:22)    Daily Weight in k.8 (2020 18:22)    PHYSICAL EXAM:      Constitutional:    Eyes:    ENMT:    Neck:    Breasts:    Back:    Respiratory:    Cardiovascular:    Gastrointestinal:    Genitourinary:    Rectal:    Extremities:    Vascular:    Neurological:    Skin:    Lymph Nodes:    Musculoskeletal:    Psychiatric:        TELEMETRY:     EKG:     CARDIAC CATH:     ECHO:    IMAGIN.7   9.94  )-----------( 227      ( 2020 02:47 )             38.2         137  |  102  |  30<H>  ----------------------------<  112<H>  3.9   |  22  |  1.22    Ca    8.9      2020 02:47  Phos  2.9       Mg     1.9         TPro  6.7  /  Alb  3.5  /  TBili  1.0  /  DBili  x   /  AST  54<H>  /  ALT  14  /  AlkPhos  91      LIVER FUNCTIONS - ( 2020 02:47 )  Alb: 3.5 g/dL / Pro: 6.7 g/dL / ALK PHOS: 91 U/L / ALT: 14 U/L / AST: 54 U/L / GGT: x           PT/INR - ( 2020 02:47 )   PT: 24.6 sec;   INR: 2.11 ratio         PTT - ( 2020 02:47 )  PTT:59.1 sec  Creatine Kinase, Serum: 145 U/L ( @ 02:47)  CKMB Units: 4.9 ng/mL ( @ 02:47)  CKMB Units: 7.8 ng/mL ( @ 19:02)  Creatine Kinase, Serum: 187 U/L ( @ 19:02)      *BLOOD GAS/ARTERIAL/MIXED/VENOUS  *LACTATE      HEALTH ISSUES - PROBLEM Dx:        HEALTH ISSUES - R/O PROBLEM Dx:      Gabriele Haywood, CCU NP   #[id number] CHIEF COMPLAINT::    INTERVAL HISTORY:  This is a 74 year old Frisian speaking woman with past medical history of Atrial Fibrillation on Coumadin, HTN, aortic anuerysm ( last measured at 4.5), and CAD  who presented from home on  with R arm weakness while eating breakfast and her "legs giving out". Pt also had elevated BP, nausea, vomiting and epigastric abdominal pain. In the ED a CTH was negative and INR 2.2 therapeutic. Neurology came to evaluate the patient who stated no TPA was necessary for RUE weakness and suspicion for CVA. On   pt developed  AF w/ RVR with HR in the 200s and was  complaining of substernal chest pain w/ stable blood pressure in the 130s. Cardizem gtt was started and nitro was given x 3.  EKG showed TWI in V3, V4. CT angio  was negative for aortic dissection. She was found to have elevated Troponin to 17 and was transferred to [facility] for possible cardiac cath She presented to the ED on a nitro drip and heparin drip, and received cardizem and plavix at [place]. She was also tachycardic to the 160s on arrival. CT head at [facility] was reportedly unremarkable however the repeat head CT showed possible L lacunar infarct.     REVIEW OF SYSTEMS: 2 week complaint of gastritis. Denies CP, SOB, all others negative    MEDICATIONS  (STANDING):  aspirin enteric coated 81 milliGRAM(s) Oral daily  atorvastatin 80 milliGRAM(s) Oral at bedtime  chlorhexidine 4% Liquid 1 Application(s) Topical <User Schedule>  clopidogrel Tablet 75 milliGRAM(s) Oral daily  diltiazem    milliGRAM(s) Oral daily  heparin  Infusion.  Unit(s)/Hr (6.5 mL/Hr) IV Continuous <Continuous>  metoprolol tartrate 25 milliGRAM(s) Oral two times a day    MEDICATIONS  (PRN):      Objective:  Vital Signs Last 24 Hrs  T(C): 37.2 (2020 06:00), Max: 37.2 (2020 06:00)  T(F): 98.9 (2020 06:00), Max: 98.9 (2020 06:00)  HR: 114 (2020 07:00) (71 - 148)  BP: 120/87 (2020 07:00) (81/59 - 132/66)  BP(mean): 97 (2020 07:00) (61 - 100)  RR: 30 (2020 07:00) (17 - 33)  SpO2: 97% (:00) (92% - 98%)  ICU Vital Signs Last 24 Hrs  T(C): 37.2 (2020 06:00), Max: 37.2 (2020 06:00)  T(F): 98.9 (2020 06:00), Max: 98.9 (2020 06:00)  HR: 114 (2020 07:00) (71 - 148)  BP: 120/87 (2020 07:00) (81/59 - 132/66)  BP(mean): 97 (2020 07:00) (61 - 100)  ABP: --  ABP(mean): --  RR: 30 (2020 07:00) (17 - 33)  SpO2: 97% (2020 07:00) (92% - 98%)      Adult Advanced Hemodynamics Last 24 Hrs  CVP(mm Hg): --  CVP(cm H2O): --  CO: --  CI: --  PA: --  PA(mean): --  PCWP: --  SVR: --  SVRI: --  PVR: --  PVRI: --       @ 07: @ 07:00  --------------------------------------------------------  IN: 278 mL / OUT: 400 mL / NET: -122 mL     @ 07: @ 07:26  --------------------------------------------------------  IN: 5.6 mL / OUT: 0 mL / NET: 5.6 mL      Daily Height in cm: 154.94 (2020 18:22)    Daily Weight in k.8 (2020 18:22)    PHYSICAL EXAM:      Constitutional: No acute distress    Eyes: PERRL    ENMT: Nml oral mucosa    Respiratory: CTA Bilaterally    Cardiovascular: S1S2 RRR    Gastrointestinal: +BS    Extremities: No pedal edema    Vascular: +1 pedal pulses    Neurological: A+OX3      TELEMETRY: SR    EKG:     CARDIAC CATH:     ECHO:  < from: Transthoracic Echocardiogram (20 @ 13:45) >    Patient name: BRADEN MORA  YOB: 1945   Age: 74 (F)   MR#: [id number]  Study Date: 2020  Location: O/PSonographer: DANNY Diego  Study quality: Technically fair  Referring Physician: Ella Mora MD  Blood Pressure: 107/70 mmHg  Height: 145 cm  Weight: 56 kg  BSA: 1.5 m2  Heart Rhythm: Atrial fibrillation  Heart Rate: 130 mmHg  ------------------------------------------------------------------------  PROCEDURE: Transthoracic echocardiogram with 2-D, M-Mode  and complete spectral and color flow Doppler.  INDICATION: Chest pain, unspecified (R07.9)  ------------------------------------------------------------------------  Dimensions:    Normal Values:  LA:     4.0    2.0 - 4.0 cm  Ao:     3.8    2.0 - 3.8 cm  SEPTUM: 1.2    0.6 - 1.2 cm  PWT:    1.2    0.6 - 1.1 cm  LVIDd:  3.8    3.0 - 5.6 cm  LVIDs:  2.7    1.8 - 4.0 cm  Derived variables:  LVMI: 105 g/m2  RWT: 0.63  Doppler Peak Velocity (m/sec): TV=2.4  ------------------------------------------------------------------------  Observations:  Mitral Valve: Mitral annular calcification. Mild mitral  regurgitation.  Aortic Valve/Aorta: Calcified aortic valve with normal  opening.  Moderate aortic regurgitation.  Normal aortic root size.  The ascending aorta is severely dilated (adjusted for BSA),  with a diameter of 4.7 cm.  Left Atrium: Severe left atrial enlargement.  Left Ventricle: Normal left ventricular internal  dimensions. Discrete basal septal hypertrophy.  Low-normal left ventricular systolicfunction. Ejection  fraction varies due to irregular RR-intervals.  The mid-septum and apical septum appear akinetic.  Right Heart: Normal right atrium. Normal right ventricular  size and function. Normal tricuspid valve. Mild tricuspid  regurgitation. Normal pulmonic valve. Mild pulmonic  regurgitation.  Pericardium/Pleura: Normal pericardium with trace  pericardial effusion.  Hemodynamic: Estimated right atrial pressure is normal.  No evidence of pulmonary hypertension.  No PFO seen wtih color Doppler.  ------------------------------------------------------------------------  Conclusions:  The rhythm during this study was atrial fibrillation.  Normal left ventricular internal dimensions. Discrete basal  septal hypertrophy.  Low-normal left ventricular systolic function. Ejection  fraction varies due to irregular RR-intervals.  The mid-septum and apical septum appear akinetic.  The ascending aorta is severely dilated (adjusted for BSA),  with a diameter of 4.7 cm.  Moderate aortic regurgitation.  ------------------------------------------------------------------------  Confirmed on  2020 - 17:16:59 by Joe Mcintyre MD, FASE  ------------------------------------------------------------------------    < end of copied text >      IMAGIN.7   9.94  )-----------( 227      ( 2020 02:47 )             38.2         137  |  102  |  30<H>  ----------------------------<  112<H>  3.9   |  22  |  1.22    Ca    8.9      2020 02:47  Phos  2.9       Mg     1.9         TPro  6.7  /  Alb  3.5  /  TBili  1.0  /  DBili  x   /  AST  54<H>  /  ALT  14  /  AlkPhos  91      LIVER FUNCTIONS - ( 2020 02:47 )  Alb: 3.5 g/dL / Pro: 6.7 g/dL / ALK PHOS: 91 U/L / ALT: 14 U/L / AST: 54 U/L / GGT: x           PT/INR - ( 2020 02:47 )   PT: 24.6 sec;   INR: 2.11 ratio         PTT - ( 2020 02:47 )  PTT:59.1 sec  Creatine Kinase, Serum: 145 U/L ( @ 02:47)  CKMB Units: 4.9 ng/mL ( @ 02:47)  CKMB Units: 7.8 ng/mL ( @ 19:02)  Creatine Kinase, Serum: 187 U/L ( @ 19:02)      *BLOOD GAS/ARTERIAL/MIXED/VENOUS  *LACTATE      HEALTH ISSUES - PROBLEM Dx:        HEALTH ISSUES - R/O PROBLEM Dx:      Gabriele Haywood, CCU NP   #[id number]

## 2020-01-28 NOTE — PROGRESS NOTE ADULT - ASSESSMENT
This is a 74 year old Kuwaiti speaking woman with past medical history of Atrial Fibrillation on Coumadin, HTN, aortic anuerysm ( last measured at 4.5), and CAD  who presented from home on 1/26 with R arm weakness while eating breakfast and her "legs giving out". Pt also had elevated BP, nausea, vomiting and epigastric abdominal pain. In the ED a CTH was negative and INR 2.2 therapeutic. Neurology came to evaluate the patient who stated no TPA was necessary for RUE weakness and suspicion for CVA. On  1/27 pt developed  AF w/ RVR with HR in the 200s and was  complaining of substernal chest pain w/ stable blood pressure in the 130s. Cardizem gtt was started and nitro was given x 3.  EKG showed TWI in V3, V4. CT angio  was negative for aortic dissection. She was found to have elevated Troponin to 17 and was transferred to [facility] for possible cardiac cath She presented to the ED on a nitro drip and heparin drip, and received cardizem and plavix at [place]. She was also tachycardic to the 160s on arrival. CT head at [facility] was reportedly unremarkable however the repeat head CT showed possible L lacunar infarct.  ECG with twave inversion, elevation in troponins, no active chest pain.

## 2020-01-28 NOTE — CHART NOTE - NSCHARTNOTEFT_GEN_A_CORE
CCU Transfer Note    Transfer from: CCU    Transfer to: (  ) Medicine    ( x ) Telemetry     (   ) RCU        (    ) Palliative         (   ) Stroke Unit            Accepting physician: Dr. Alberto     HPI:  74 year old French speaking female with PMHx of gastritis, AF, HTN, aortic anuerysm ( last measured at 4.5), CAD  who presented from home on 1/26 with R arm weakness while eating breakfast and her "legs giving out". Pt also had elevated BP, nausea, vomiting and epigastric abdominal pain. In the ED a CTH was negative and INR 2.2 therapeutic. Neurology came to evaluate the patient who stated no TPA was necessary for RUE weakness and suspicion for CVA.     On  1/27 pt developed  AF w/ RVR with HR in the 200s and was complaining of substernal chest pain w/ stable blood pressure in the 130s. Cardizem gtt was started and nitro was given x 3.  EKG showed TWI in V3, V4. CT angio was negative for aortic dissection. She was found to have elevated Troponin to 17 and was transferred to [facility] for possible cardiac cath. She presented to the ED on a nitro drip and heparin drip, and received cardizem and plavix at [place]. She was also tachycardic to the 160s on arrival. CT head at [place] was reportedly unremarkable however the repeat head CT showed possible L lacunar infarct.     Upon admission to CCU 2  pt had no more chest pain and HR was sinus rhythm 80s. (27 Jan 2020 17:02)    CCU Course: pt was continued on hep gtt for afib, HR 40's- 50's, asymptomatic, c/w ASA, Plavix, Lipitor, lopressor on hold due to bradycardia HR 40's-50's, neurology team following, s/p MRI pending results and clearance for eventual cath.     Follow up:  [ ] Monitor HR, currently on po cardizem and po lopressor   [ ] Neurology following, f/u MRI results   [ ] currently on heparin gtt for NSTEMI/atrial fibrillation  [ ] Plan is for cath         Vital Signs Last 24 Hrs  T(C): 37.1 (28 Jan 2020 19:00), Max: 37.2 (28 Jan 2020 06:00)  T(F): 98.8 (28 Jan 2020 19:00), Max: 98.9 (28 Jan 2020 06:00)  HR: 52 (28 Jan 2020 22:00) (52 - 138)  BP: 102/65 (28 Jan 2020 22:00) (81/59 - 141/81)  BP(mean): 78 (28 Jan 2020 22:00) (65 - 102)  RR: 22 (28 Jan 2020 22:00) (22 - 42)  SpO2: 96% (28 Jan 2020 22:00) (92% - 98%)  I&O's Summary    27 Jan 2020 07:01  -  28 Jan 2020 07:00  --------------------------------------------------------  IN: 278 mL / OUT: 400 mL / NET: -122 mL    28 Jan 2020 07:01  -  28 Jan 2020 23:19  --------------------------------------------------------  IN: 735.1 mL / OUT: 1350 mL / NET: -614.9 mL        MEDICATIONS  (STANDING):  aspirin enteric coated 81 milliGRAM(s) Oral daily  atorvastatin 80 milliGRAM(s) Oral at bedtime  clopidogrel Tablet 75 milliGRAM(s) Oral daily  diltiazem    milliGRAM(s) Oral daily  heparin  Infusion. 500 Unit(s)/Hr (5 mL/Hr) IV Continuous <Continuous>  heparin  Injectable 3200 Unit(s) IV Push once  metoprolol tartrate 25 milliGRAM(s) Oral two times a day    MEDICATIONS  (PRN):  heparin  Injectable 3200 Unit(s) IV Push every 6 hours PRN For aPTT less than 40        LABS                                            12.7                  Neurophils% (auto):   x      (01-28 @ 02:47):    9.94 )-----------(227          Lymphocytes% (auto):  x                                             38.2                   Eosinphils% (auto):   x        Manual%: Neutrophils x    ; Lymphocytes x    ; Eosinophils x    ; Bands%: x    ; Blasts x                                    137    |  102    |  30                  Calcium: 8.9   / iCa: x      (01-28 @ 02:47)    ----------------------------<  112       Magnesium: 1.9                              3.9     |  22     |  1.22             Phosphorous: 2.9      TPro  6.7    /  Alb  3.5    /  TBili  1.0    /  DBili  x      /  AST  54     /  ALT  14     /  AlkPhos  91     28 Jan 2020 02:47    ( 01-28 @ 18:30 )   PT: x    ;   INR: x      aPTT: 95.8 sec          Lorraine Contreras  Internal Medicine PGY-3  Pager [phone] | [phone]

## 2020-01-28 NOTE — OCCUPATIONAL THERAPY INITIAL EVALUATION ADULT - ADDITIONAL COMMENTS
CT Head: No prior studies available for comparison at this time. Age-appropriate involutional change with microvascular ischemic disease. Suspicion of a left thalamic lacunar infarct. Of note, is questionable left MCA compromise based on appearance of the contrast opacification that is residually remaining from contrast administered at outside hospital. Recommend correlation with MR and MRA or CTA for more complete evaluation.

## 2020-01-28 NOTE — DISCHARGE NOTE NURSING/CASE MANAGEMENT/SOCIAL WORK - PATIENT PORTAL LINK FT
You can access the FollowMyHealth Patient Portal offered by [facility] by registering at the following website: [email or web address]. By joining [facility]’s FollowMyHealth portal, you will also be able to view your health information using other applications (apps) compatible with our system.

## 2020-01-28 NOTE — PHYSICAL THERAPY INITIAL EVALUATION ADULT - PRECAUTIONS/LIMITATIONS, REHAB EVAL
fall precautions/CTH: No prior studies available for comparison at this time. Age-appropriate involutional change with microvascular ischemic disease. Suspicion of a left thalamic lacunar infarct. Of note, is questionable left MCA compromise based on appearance of the contrast opacification that is residually remaining from contrast administered at outside hospital. Recommend correlation with MR and MRA or CTA for more complete evaluation./cardiac precautions

## 2020-01-28 NOTE — PHYSICAL THERAPY INITIAL EVALUATION ADULT - GENERAL OBSERVATIONS, REHAB EVAL
Pt received semisupine in bed, German speaking with  phone used t/o, A&Ox4, following commands, pleasant & willing to participate, a/w NSTEMI & TIA, +supplemental 02.

## 2020-01-28 NOTE — OCCUPATIONAL THERAPY INITIAL EVALUATION ADULT - BALANCE TRAINING, PT EVAL
GOAL: Pt will demonstrate improved static/dynamic balance to good in order to increase safety and independence in ADLs within 2 weeks

## 2020-01-28 NOTE — PHYSICAL THERAPY INITIAL EVALUATION ADULT - PLANNED THERAPY INTERVENTIONS, PT EVAL
bed mobility training/GOALS: Pt will negotiate 6 steps with unilateral handrail & step to pattern with supervision in 4wks/gait training/transfer training

## 2020-01-28 NOTE — PHYSICAL THERAPY INITIAL EVALUATION ADULT - GAIT DEVIATIONS NOTED, PT EVAL
decreased weight-shifting ability/decreased ashtyn/decreased velocity of limb motion/decreased step length/increased time in double stance

## 2020-01-28 NOTE — OCCUPATIONAL THERAPY INITIAL EVALUATION ADULT - LIVES WITH, PROFILE
Pt lives with family in apartment with 6 steps to enter, tub in bathroom. Pt I in ADLs and ambulation prior to admission

## 2020-01-28 NOTE — PHYSICAL THERAPY INITIAL EVALUATION ADULT - PERTINENT HX OF CURRENT PROBLEM, REHAB EVAL
Pt is 74F admitted 1/27/20 PMHx gastritis, AF, HTN, aortic anuerysm, CAD, p/w R arm weakness while eating breakfast & "legs giving out". Pt developed AF w/ RVR with HR in the 200s, c/o substernal chest pain. CT angio (-)aortic dissection. Pt found to have elevated Troponin; transferred to [facility] for possible cardiac cath. Repeat CTH showed possible left lacunar infarc.

## 2020-01-28 NOTE — PHYSICAL THERAPY INITIAL EVALUATION ADULT - CRITERIA FOR SKILLED THERAPEUTIC INTERVENTIONS
anticipated discharge recommendation/rehab potential/predicted duration of therapy intervention/impairments found/functional limitations in following categories/therapy frequency/anticipated equipment needs at discharge

## 2020-01-29 ENCOUNTER — TRANSCRIPTION ENCOUNTER (OUTPATIENT)
Age: 75
End: 2020-01-29

## 2020-01-29 DIAGNOSIS — K29.70 GASTRITIS, UNSPECIFIED, WITHOUT BLEEDING: ICD-10-CM

## 2020-01-29 DIAGNOSIS — Z29.9 ENCOUNTER FOR PROPHYLACTIC MEASURES, UNSPECIFIED: ICD-10-CM

## 2020-01-29 DIAGNOSIS — E78.5 HYPERLIPIDEMIA, UNSPECIFIED: ICD-10-CM

## 2020-01-29 DIAGNOSIS — I48.0 PAROXYSMAL ATRIAL FIBRILLATION: ICD-10-CM

## 2020-01-29 LAB
ALBUMIN SERPL ELPH-MCNC: 3.4 G/DL — SIGNIFICANT CHANGE UP (ref 3.3–5)
ALP SERPL-CCNC: 84 U/L — SIGNIFICANT CHANGE UP (ref 40–120)
ALT FLD-CCNC: 12 U/L — SIGNIFICANT CHANGE UP (ref 10–45)
ANION GAP SERPL CALC-SCNC: 11 MMOL/L — SIGNIFICANT CHANGE UP (ref 5–17)
APTT BLD: 50.4 SEC — HIGH (ref 27.5–36.3)
APTT BLD: 55.3 SEC — HIGH (ref 27.5–36.3)
APTT BLD: 59.7 SEC — HIGH (ref 27.5–36.3)
APTT BLD: 62.5 SEC — HIGH (ref 27.5–36.3)
AST SERPL-CCNC: 37 U/L — SIGNIFICANT CHANGE UP (ref 10–40)
BILIRUB SERPL-MCNC: 0.6 MG/DL — SIGNIFICANT CHANGE UP (ref 0.2–1.2)
BUN SERPL-MCNC: 26 MG/DL — HIGH (ref 7–23)
CALCIUM SERPL-MCNC: 8.8 MG/DL — SIGNIFICANT CHANGE UP (ref 8.4–10.5)
CHLORIDE SERPL-SCNC: 102 MMOL/L — SIGNIFICANT CHANGE UP (ref 96–108)
CO2 SERPL-SCNC: 23 MMOL/L — SIGNIFICANT CHANGE UP (ref 22–31)
CREAT SERPL-MCNC: 1.18 MG/DL — SIGNIFICANT CHANGE UP (ref 0.5–1.3)
GLUCOSE SERPL-MCNC: 100 MG/DL — HIGH (ref 70–99)
HCT VFR BLD CALC: 34.2 % — LOW (ref 34.5–45)
HCT VFR BLD CALC: 34.9 % — SIGNIFICANT CHANGE UP (ref 34.5–45)
HGB BLD-MCNC: 11 G/DL — LOW (ref 11.5–15.5)
HGB BLD-MCNC: 11.4 G/DL — LOW (ref 11.5–15.5)
MAGNESIUM SERPL-MCNC: 2.1 MG/DL — SIGNIFICANT CHANGE UP (ref 1.6–2.6)
MCHC RBC-ENTMCNC: 29.9 PG — SIGNIFICANT CHANGE UP (ref 27–34)
MCHC RBC-ENTMCNC: 30.2 PG — SIGNIFICANT CHANGE UP (ref 27–34)
MCHC RBC-ENTMCNC: 32.2 GM/DL — SIGNIFICANT CHANGE UP (ref 32–36)
MCHC RBC-ENTMCNC: 32.7 GM/DL — SIGNIFICANT CHANGE UP (ref 32–36)
MCV RBC AUTO: 92.3 FL — SIGNIFICANT CHANGE UP (ref 80–100)
MCV RBC AUTO: 92.9 FL — SIGNIFICANT CHANGE UP (ref 80–100)
NRBC # BLD: 0 /100 WBCS — SIGNIFICANT CHANGE UP (ref 0–0)
NRBC # BLD: 0 /100 WBCS — SIGNIFICANT CHANGE UP (ref 0–0)
PHOSPHATE SERPL-MCNC: 2.7 MG/DL — SIGNIFICANT CHANGE UP (ref 2.5–4.5)
PLATELET # BLD AUTO: 230 K/UL — SIGNIFICANT CHANGE UP (ref 150–400)
PLATELET # BLD AUTO: 243 K/UL — SIGNIFICANT CHANGE UP (ref 150–400)
POTASSIUM SERPL-MCNC: 3.7 MMOL/L — SIGNIFICANT CHANGE UP (ref 3.5–5.3)
POTASSIUM SERPL-SCNC: 3.7 MMOL/L — SIGNIFICANT CHANGE UP (ref 3.5–5.3)
PROT SERPL-MCNC: 6.4 G/DL — SIGNIFICANT CHANGE UP (ref 6–8.3)
RBC # BLD: 3.68 M/UL — LOW (ref 3.8–5.2)
RBC # BLD: 3.78 M/UL — LOW (ref 3.8–5.2)
RBC # FLD: 14.6 % — HIGH (ref 10.3–14.5)
RBC # FLD: 14.6 % — HIGH (ref 10.3–14.5)
SODIUM SERPL-SCNC: 136 MMOL/L — SIGNIFICANT CHANGE UP (ref 135–145)
WBC # BLD: 6.57 K/UL — SIGNIFICANT CHANGE UP (ref 3.8–10.5)
WBC # BLD: 8.48 K/UL — SIGNIFICANT CHANGE UP (ref 3.8–10.5)
WBC # FLD AUTO: 6.57 K/UL — SIGNIFICANT CHANGE UP (ref 3.8–10.5)
WBC # FLD AUTO: 8.48 K/UL — SIGNIFICANT CHANGE UP (ref 3.8–10.5)

## 2020-01-29 PROCEDURE — 99223 1ST HOSP IP/OBS HIGH 75: CPT

## 2020-01-29 PROCEDURE — 99233 SBSQ HOSP IP/OBS HIGH 50: CPT | Mod: GC

## 2020-01-29 PROCEDURE — 99233 SBSQ HOSP IP/OBS HIGH 50: CPT

## 2020-01-29 RX ORDER — PANTOPRAZOLE SODIUM 20 MG/1
40 TABLET, DELAYED RELEASE ORAL
Refills: 0 | Status: DISCONTINUED | OUTPATIENT
Start: 2020-01-29 | End: 2020-01-31

## 2020-01-29 RX ADMIN — HEPARIN SODIUM 600 UNIT(S)/HR: 5000 INJECTION INTRAVENOUS; SUBCUTANEOUS at 09:19

## 2020-01-29 RX ADMIN — ATORVASTATIN CALCIUM 80 MILLIGRAM(S): 80 TABLET, FILM COATED ORAL at 21:28

## 2020-01-29 RX ADMIN — HEPARIN SODIUM 600 UNIT(S)/HR: 5000 INJECTION INTRAVENOUS; SUBCUTANEOUS at 15:47

## 2020-01-29 RX ADMIN — Medication 81 MILLIGRAM(S): at 09:19

## 2020-01-29 RX ADMIN — HEPARIN SODIUM 600 UNIT(S)/HR: 5000 INJECTION INTRAVENOUS; SUBCUTANEOUS at 22:11

## 2020-01-29 RX ADMIN — Medication 25 MILLIGRAM(S): at 09:19

## 2020-01-29 RX ADMIN — HEPARIN SODIUM 500 UNIT(S)/HR: 5000 INJECTION INTRAVENOUS; SUBCUTANEOUS at 02:21

## 2020-01-29 RX ADMIN — Medication 240 MILLIGRAM(S): at 09:19

## 2020-01-29 RX ADMIN — PANTOPRAZOLE SODIUM 40 MILLIGRAM(S): 20 TABLET, DELAYED RELEASE ORAL at 10:57

## 2020-01-29 RX ADMIN — CLOPIDOGREL BISULFATE 75 MILLIGRAM(S): 75 TABLET, FILM COATED ORAL at 09:19

## 2020-01-29 NOTE — PROGRESS NOTE ADULT - SUBJECTIVE AND OBJECTIVE BOX
cc: transferred for NSTEMI    HPI:  74 year old Urdu speaking female with PMHx of gastritis, AF, HTN, aortic anuerysm ( last measured at 4.5), CAD  who presented from home on 1/26 with R arm weakness while eating breakfast and her "legs giving out". Pt also had elevated BP, nausea, vomiting and epigastric abdominal pain. In the ED a CTH was negative and INR 2.2 therapeutic. Neurology came to evaluate the patient who stated no TPA was necessary for RUE weakness and suspicion for CVA. On 1/27 pt developed  AF w/ RVR with HR in the 200s and was complaining of substernal chest pain w/ stable blood pressure in the 130s. Cardizem gtt was started and nitro was given x 3.  EKG showed TWI in V3, V4. CT angio was negative for aortic dissection. She was found to have elevated Troponin to 17 and was transferred to [facility] for possible cardiac cath. She presented to the ED on a nitro drip and heparin drip, and received cardizem and plavix at [place]. She was also tachycardic to the 160s on arrival. CT head at [place] was reportedly unremarkable however the repeat head CT showed possible L lacunar infarct.     Upon admission to CCU 2  pt had no more chest pain and HR was sinus rhythm 80s. (27 Jan 2020 17:02)    CCU Course: pt was continued on hep gtt for afib, HR 40's- 50's, asymptomatic, c/w ASA, Plavix, Lipitor, lopressor on hold due to bradycardia HR 40's-50's, neurology team following, s/p MRI pending results and clearance for eventual cath.     Follow up:  [ ] Monitor HR, currently on po cardizem and po lopressor   [ ] Neurology following, f/u MRI results   [ ] currently on heparin gtt for NSTEMI/atrial fibrillation  [ ] Plan is for cath         PAST MEDICAL & SURGICAL HISTORY:  Atrial fibrillation  CAD (coronary artery disease)  Gastritis  HLD (hyperlipidemia)      Review of Systems:   CONSTITUTIONAL: No fever  EYES: No eye pain, visual disturbances  ENMT:  No difficulty hearing,   NECK: No pain or stiffness  RESPIRATORY: No cough, No shortness of breath  CARDIOVASCULAR: No chest pain, palpitations,  GASTROINTESTINAL: No abdominal or epigastric pain. No nausea, vomiting,  GENITOURINARY: No dysuria,   NEUROLOGICAL: No headaches,   SKIN: No rashes  ENDOCRINE: No heat or cold intolerance  MUSCULOSKELETAL: No joint pain or swelling;   PSYCHIATRIC: No depression,   ALLERY AND IMMUNOLOGIC: No hives or eczema    Allergies    No Known Allergies    Intolerances        Social History:     FAMILY HISTORY:      MEDICATIONS  (STANDING):  aspirin enteric coated 81 milliGRAM(s) Oral daily  atorvastatin 80 milliGRAM(s) Oral at bedtime  clopidogrel Tablet 75 milliGRAM(s) Oral daily  diltiazem    milliGRAM(s) Oral daily  heparin  Infusion. 500 Unit(s)/Hr (5 mL/Hr) IV Continuous <Continuous>  heparin  Injectable 3200 Unit(s) IV Push once    MEDICATIONS  (PRN):  heparin  Injectable 3200 Unit(s) IV Push every 6 hours PRN For aPTT less than 40      Vital Signs Last 24 Hrs  T(C): 36.4 (29 Jan 2020 04:48), Max: 37.1 (28 Jan 2020 19:00)  T(F): 97.6 (29 Jan 2020 04:48), Max: 98.8 (28 Jan 2020 19:00)  HR: 55 (29 Jan 2020 04:48) (51 - 86)  BP: 116/70 (29 Jan 2020 04:48) (99/63 - 141/81)  BP(mean): 84 (29 Jan 2020 00:00) (74 - 102)  RR: 18 (29 Jan 2020 04:48) (18 - 42)  SpO2: 96% (29 Jan 2020 04:48) (92% - 99%)  CAPILLARY BLOOD GLUCOSE        I&O's Summary    28 Jan 2020 07:01  -  29 Jan 2020 07:00  --------------------------------------------------------  IN: 735.1 mL / OUT: 1350 mL / NET: -614.9 mL        PHYSICAL EXAM:  GENERAL: NAD, breathing normal  HEAD:  Atraumatic, Normocephalic  EYES: conjunctiva and sclera clear  NECK: supple, No JVD  CHEST/LUNG: CTA b/l  HEART: S1 S2 RRR  ABDOMEN: +BS Soft, NT/ND  EXTREMITIES:  2+ DP Pulses, No c/c/e  NEUROLOGY: AAOx3, no facial droop, no focal deficits   SKIN: No rashes or lesions      LABS:                        11.4   6.57  )-----------( 243      ( 29 Jan 2020 08:48 )             34.9     01-29    136  |  102  |  26<H>  ----------------------------<  100<H>  3.7   |  23  |  1.18    Ca    8.8      29 Jan 2020 08:48  Phos  2.7     01-29  Mg     2.1     01-29    TPro  6.4  /  Alb  3.4  /  TBili  0.6  /  DBili  x   /  AST  37  /  ALT  12  /  AlkPhos  84  01-29    PT/INR - ( 28 Jan 2020 02:47 )   PT: 24.6 sec;   INR: 2.11 ratio         PTT - ( 29 Jan 2020 08:48 )  PTT:50.4 sec  CARDIAC MARKERS ( 28 Jan 2020 02:47 )  x     / x     / 145 U/L / x     / 4.9 ng/mL  CARDIAC MARKERS ( 27 Jan 2020 19:02 )  x     / x     / 187 U/L / x     / 7.8 ng/mL          RADIOLOGY & ADDITIONAL TESTS:    Imaging Personally Reviewed: MRA head reviewed - No gross evidence for vascular aneurysm or flow-limiting stenosis.  CT head 1/27 reviewed -  Age-appropriate involutional change with microvascular ischemic disease. Suspicion of a left thalamic lacunar infarct. Of note, is questionable left MCA compromise based on appearance of the contrast opacification that is residually remaining from contrast administered at outside hospital.     EKG tracing reviewed -     Consultant(s) Notes Reviewed:      Care Discussed with Consultants/Other Providers: cc: transferred for NSTEMI    HPI:  74 year old Upper sorbian speaking female with PMHx of gastritis, AF, HTN, aortic anuerysm ( last measured at 4.5), CAD  who presented from home on 1/26 with R arm weakness while eating breakfast and her "legs giving out". Pt also had elevated BP, nausea, vomiting and epigastric abdominal pain. In the ED a CTH was negative and INR 2.2 therapeutic. Neurology came to evaluate the patient who stated no TPA was necessary for RUE weakness and suspicion for CVA. On 1/27 pt developed  AF w/ RVR with HR in the 200s and was complaining of substernal chest pain w/ stable blood pressure in the 130s. Cardizem gtt was started and nitro was given x 3.  EKG showed TWI in V3, V4. CT angio was negative for aortic dissection. She was found to have elevated Troponin to 17 and was transferred to [facility] for possible cardiac cath. She presented to the ED on a nitro drip and heparin drip, and received cardizem and plavix at [place]. She was also tachycardic to the 160s on arrival. CT head at [place] was reportedly unremarkable however the repeat head CT showed possible L lacunar infarct.     CCU Course: pt was continued on hep gtt for afib, HR 40's- 50's, asymptomatic, c/w ASA, Plavix, Lipitor, lopressor on hold due to bradycardia HR 40's-50's, neurology team following, s/p MRI pending results and clearance for eventual cath.     Follow up:  [ ] Monitor HR, currently on po cardizem and po lopressor   [ ] Neurology following, f/u MRI results   [ ] currently on heparin gtt for NSTEMI/atrial fibrillation  [ ] Plan is for cath         PAST MEDICAL & SURGICAL HISTORY:  Atrial fibrillation  CAD (coronary artery disease)  Gastritis  HLD (hyperlipidemia)      Review of Systems:   CONSTITUTIONAL: No fever  EYES: No eye pain, visual disturbances  ENMT:  No difficulty hearing,   NECK: No pain or stiffness  RESPIRATORY: No cough, No shortness of breath  CARDIOVASCULAR: No chest pain, palpitations,  GASTROINTESTINAL: No abdominal or epigastric pain. No nausea, vomiting,  GENITOURINARY: No dysuria,   NEUROLOGICAL: No headaches,   SKIN: No rashes  ENDOCRINE: No heat or cold intolerance  MUSCULOSKELETAL: No joint pain or swelling;   PSYCHIATRIC: No depression,   ALLERY AND IMMUNOLOGIC: No hives or eczema    Allergies    No Known Allergies        Social History: no smoking, no etoh abuse    FAMILY HISTORY: son had MI      MEDICATIONS  (STANDING):  aspirin enteric coated 81 milliGRAM(s) Oral daily  atorvastatin 80 milliGRAM(s) Oral at bedtime  clopidogrel Tablet 75 milliGRAM(s) Oral daily  diltiazem    milliGRAM(s) Oral daily  heparin  Infusion. 500 Unit(s)/Hr (5 mL/Hr) IV Continuous <Continuous>  heparin  Injectable 3200 Unit(s) IV Push once    MEDICATIONS  (PRN):  heparin  Injectable 3200 Unit(s) IV Push every 6 hours PRN For aPTT less than 40      Vital Signs Last 24 Hrs  T(C): 36.4 (29 Jan 2020 04:48), Max: 37.1 (28 Jan 2020 19:00)  T(F): 97.6 (29 Jan 2020 04:48), Max: 98.8 (28 Jan 2020 19:00)  HR: 55 (29 Jan 2020 04:48) (51 - 86)  BP: 116/70 (29 Jan 2020 04:48) (99/63 - 141/81)  BP(mean): 84 (29 Jan 2020 00:00) (74 - 102)  RR: 18 (29 Jan 2020 04:48) (18 - 42)  SpO2: 96% (29 Jan 2020 04:48) (92% - 99%)  CAPILLARY BLOOD GLUCOSE        I&O's Summary    28 Jan 2020 07:01  -  29 Jan 2020 07:00  --------------------------------------------------------  IN: 735.1 mL / OUT: 1350 mL / NET: -614.9 mL        PHYSICAL EXAM:  GENERAL: NAD, breathing normal  HEAD:  Atraumatic, Normocephalic  EYES: conjunctiva and sclera clear  NECK: supple, No JVD  CHEST/LUNG: CTA b/l  HEART: S1 S2 RRR  ABDOMEN: +BS Soft, mild epigastric tenderness/ND  EXTREMITIES:  2+ DP Pulses, No c/c. no LE edema  NEUROLOGY: AAOx3, no facial droop, moving all extremities  SKIN: No rashes or lesions      LABS:                        11.4   6.57  )-----------( 243      ( 29 Jan 2020 08:48 )             34.9     01-29    136  |  102  |  26<H>  ----------------------------<  100<H>  3.7   |  23  |  1.18    Ca    8.8      29 Jan 2020 08:48  Phos  2.7     01-29  Mg     2.1     01-29    TPro  6.4  /  Alb  3.4  /  TBili  0.6  /  DBili  x   /  AST  37  /  ALT  12  /  AlkPhos  84  01-29    PT/INR - ( 28 Jan 2020 02:47 )   PT: 24.6 sec;   INR: 2.11 ratio         PTT - ( 29 Jan 2020 08:48 )  PTT:50.4 sec  CARDIAC MARKERS ( 28 Jan 2020 02:47 )  x     / x     / 145 U/L / x     / 4.9 ng/mL  CARDIAC MARKERS ( 27 Jan 2020 19:02 )  x     / x     / 187 U/L / x     / 7.8 ng/mL          RADIOLOGY & ADDITIONAL TESTS:    Imaging Personally Reviewed: MRA head reviewed - No gross evidence for vascular aneurysm or flow-limiting stenosis.  CT head 1/27 reviewed -  Age-appropriate involutional change with microvascular ischemic disease. Suspicion of a left thalamic lacunar infarct. Of note, is questionable left MCA compromise based on appearance of the contrast opacification that is residually remaining from contrast administered at outside hospital.     EKG tracing reviewed - Afib w/ rvr @105bpm, TWI anteroseptally    Consultant(s) Notes Reviewed:      Care Discussed with Consultants/Other Providers:

## 2020-01-29 NOTE — PROVIDER CONTACT NOTE (OTHER) - ASSESSMENT
patient asymptomatic, denies cp/sob. patient resting in bed at time of events on tele monitor. SB 50-60's on tele and briefly shahzad to 46 on tele monitor. patient asymptomatic.

## 2020-01-29 NOTE — PROGRESS NOTE ADULT - ASSESSMENT
74 year old Malay speaking female with CAD, AFib, HTN, Aortic Aneurysm (4.5cm)  a/w CVA to OSH with course c/b AFib with RVR and NSTEMI.  Neuro with no contraindication to AC.    Recommendations:  1. ASA 81mg daily, Plavix 75mg daily, Atorvastatin 80mg qhs  2. Cardizem 240mg daily  3. Heparin gtt for AC  4. [facility] this afternoon    Arthur Hurley M.D.  Cardiology Fellow  [phone]  For all Cardiology service contact information, go to [email or web address] and use "[email or web address]" to login. 74 year old Romanian speaking female with CAD, AFib, HTN, Aortic Aneurysm (4.5cm)  a/w CVA to OSH with course c/b AFib with RVR and NSTEMI.  Echo with low-normal LVEF and akinesis mid-apical septum  Neuro with no contraindication to AC.    Recommendations:  1. ASA 81mg daily, Plavix 75mg daily, Atorvastatin 80mg qhs  2. Cardizem 240mg daily  3. Discontinue Metoprolol   4. Heparin gtt for AC  5. [facility] this afternoon    Arthur Hurley M.D.  Cardiology Fellow  [phone]  For all Cardiology service contact information, go to [email or web address] and use "[email or web address]" to login. 74 year old Vietnamese speaking female with CAD, AFib, HTN, Aortic Aneurysm (4.5cm)  a/w CVA to OSH with course c/b AFib with RVR and NSTEMI transferred to [facility].  Echo with low-normal LVEF and akinesis mid-apical septum  Neuro with no contraindication to AC.    Impression:  NSTEMI    Recommendations:  1. ASA 81mg daily, Plavix 75mg daily, Atorvastatin 80mg qhs  2. Heparin gtt for AC  3. Cardizem 240mg daily  4. Discontinue Metoprolol   5. [facility] this afternoon    Arthur Hurley M.D.  Cardiology Fellow  [phone]  For all Cardiology service contact information, go to [email or web address] and use "[email or web address]" to login. 74 year old Azeri speaking female with CAD, AFib, HTN, Aortic Aneurysm (4.5cm)  a/w CVA to OSH with course c/b AFib with RVR and NSTEMI transferred to [facility].  Echo with low-normal LVEF and akinesis mid-apical septum  Neuro with no contraindication to AC.    Impression:  NSTEMI Type II    Recommendations:  1. ASA 81mg daily, Plavix 75mg daily, Atorvastatin 80mg qhs  2. Discontinue Heparin gtt after 48 hours  3. Cardizem 240mg daily  4. Discontinue Metoprolol   5. Case discussed with Interventional Cardiology. Given low CK-MB and acute CVA there is an increased risk of another CVA with LHC in the immediate 4-6 weeks. Coronary angiogram to be deferred at this time. Patient should have coronary angiogram in 6-8 weeks.     Arthur Hurley M.D.  Cardiology Fellow  [phone]  For all Cardiology service contact information, go to [email or web address] and use "[email or web address]" to login. 74 year old Polish speaking female with CAD, AFib, HTN, Aortic Aneurysm (4.5cm).  Now a/w CVA to OSH, with course c/b AFib with RVR and NSTEMI; transferred to [facility].  Echo with low-normal LVEF and akinesis mid-apical septum  Neuro with no contraindication to AC.    Impression:  NSTEMI Type II    Recommendations:  1. ASA 81mg daily, Plavix 75mg daily, atorvastatin 80mg qhs  2. Discontinue heparin gtt after 48 hours  3. Cardizem 240mg daily  4. Discontinue metoprolol   5. Case discussed with Interventional Cardiology. Given low CK-MB and acute CVA there is an increased risk of another CVA with LHC in the immediate 4-6 weeks. Coronary angiogram to be deferred at this time; continue medical treatment of heart disease. Patient should have coronary angiogram in 6-8 weeks.     Arthur Hurley M.D.  Cardiology Fellow  [phone]    Lyle Lowe M.D.  Cardiology Attending, Consult Service  Beeper     For all Cardiology service contact information, go to [email or web address] and use "[email or web address]" to login. 74 year old Danish speaking female with CAD, AFib, HTN, Aortic Aneurysm (4.5cm).  Now a/w CVA to OSH, with course c/b AFib with RVR and NSTEMI; transferred to [facility].  Echo with low-normal LVEF and akinesis mid-apical septum  Neuro with no contraindication to AC.    Impression:  NSTEMI Type II    Recommendations:  1. Continue ASA 81mg daily and Atorvastatin 80mg qhs  2. Discontinue heparin gtt after 48 hours. Stop Plavix. Restart Eliquis 5mg BID.   3. Cardizem 240mg daily  4. Discontinue metoprolol   5. Case discussed with Interventional Cardiology. Given low CK-MB and acute CVA there is an increased risk of another CVA with LHC in the immediate 4-6 weeks. Coronary angiogram to be deferred at this time; continue medical treatment of heart disease. Patient should have coronary angiogram in 6-8 weeks.     Arthur Hurley M.D.  Cardiology Fellow  [phone]    Lyle Lowe M.D.  Cardiology Attending, Consult Service  Beeper     For all Cardiology service contact information, go to [email or web address] and use "[email or web address]" to login.

## 2020-01-29 NOTE — PROVIDER CONTACT NOTE (OTHER) - ASSESSMENT
Patient A&Ox4, VSS. [place] speaking, pt's son at bedside able to translate. Patient denies chest pain, lightheadedness, or shortness of breath. HR 67, /78, RR 17, O2 saturation 93% on 2L NC.

## 2020-01-29 NOTE — PROGRESS NOTE ADULT - ASSESSMENT
77 year old F w/ pmh of HTN, HLD, aortic aneurysm, AF who presented to OSh w/ HTN, Af w/ RVR and new RUE weakness concerning for stroke complicated by chest pain w/ positive EKGs consistent w/ NSTEMI    Stroke  - Continue DAPT  - MRI brain  -PT/OT  - Neuro check Q 45    NSTEMI  - ECHO revealing normal LV, likely demand in the setting of AF w/ RVR  - Continue to trend CE  - Aspirin load, continue plavix and heparin, ok with Neuro    Af w/ RVR  - Continue oral cardizem and BB  Now in NSr 77 year old F w/ pmh of HTN, HLD, aortic aneurysm, AF who presented to OSh w/ HTN, Af w/ RVR and new RUE weakness concerning for stroke complicated by chest pain w/ positive EKGs consistent w/ NSTEMI

## 2020-01-29 NOTE — DISCHARGE NOTE PROVIDER - NSDCCPCAREPLAN_GEN_ALL_CORE_FT
PRINCIPAL DISCHARGE DIAGNOSIS  Diagnosis: NSTEMI (non-ST elevated myocardial infarction)  Assessment and Plan of Treatment: s/p Cardiac catherterization.  Follow up at Cardiac Clinic as an outpatient.      SECONDARY DISCHARGE DIAGNOSES  Diagnosis: Paroxysmal atrial fibrillation  Assessment and Plan of Treatment: Atrial fibrillation is the most common heart rhythm problem & has the risk of stroke & heart attack  It helps if you control your blood pressure, not drink more than 1-2 alcohol drinks per day, cut down on caffeine, getting treatment for over active thyroid gland, & getting exercise  Call your doctor if you feel your heart racing or beating unusually, chest tightness or pain, lightheaded, faint, shortness of breath especially with exercise  It is important to take your heart medication as prescribed  You may be on anticoagulation which is very important to take as directed - you may need blood work to monitor drug levels      Diagnosis: CVA (cerebral vascular accident)  Assessment and Plan of Treatment: Stable.  Follow up at Medicine Clinic as an outpateint.    Diagnosis: Hyperlipidemia, unspecified hyperlipidemia type  Assessment and Plan of Treatment: Continue with your Medication as ordered.

## 2020-01-29 NOTE — DISCHARGE NOTE PROVIDER - HOSPITAL COURSE
77 year old F w/ pmh of HTN, HLD, aortic aneurysm, AF who presented to OSh w/ HTN, Af w/ RVR and new RUE weakness concerning for stroke complicated by chest pain w/ positive EKGs consistent w/ NSTEMI            ·  Problem: NSTEMI (non-ST elevated myocardial infarction).  Plan: Troponin peak 960    c/w asa, statin    deferring angiogram for 6-8 weeks due to risk of CVA    no plavix 2/2 pt being on asa and eliquis.             ·  Problem: CVA (cerebral vascular accident).  Plan: reported CT head negative at OSH    repeat CTH here with ?L lacunar infarct    MRA negative     MRI brain with Acute infarcts left thalamus and right cerebellum may be due to hypercoagulable state or cardioembolic source. No hemorrhage. Atrophy and moderate small vessel white matter ischemic changes.    eliquis and asa.         ·  Problem: Paroxysmal atrial fibrillation.  Plan: afib w/ rvr on admission - hr better controlled now    c/w diltiazem     metoprolol held due to bradycardia     chads2 vasc - - cont eliquis.         ·  Problem: Gastritis.  Plan: epigastric discomfort after eating     will start protonix daily    ?h/o gi bleed in past per son - will need to monitor h/h             ·  Problem: Hyperlipidemia, unspecified hyperlipidemia type.  Plan:     c/w high intensity statin.             Pt stable dc home with outpatient follow up with Neurology, Medicine Clinic and Cardiology Clinic. 77 year old F w/ pmh of HTN, HLD, aortic aneurysm, AF who presented to OSh w/ HTN, Af w/ RVR and new RUE weakness concerning for stroke complicated by chest pain w/ positive EKGs consistent w/ NSTEMI        UNCLEAR CAUSE OF NSTEMI AS ANGIOGRAM WAS DEFERRED IN SETTING OF RECENT STROKE. CANNOT CLINICALLY DETERMINE IF IT WAS FROM DEMAND ISCHEMIA.             ·  Problem: NSTEMI (non-ST elevated myocardial infarction).  Plan: Troponin peak 960    c/w asa, statin    deferring angiogram for 6-8 weeks due to risk of CVA    no plavix 2/2 pt being on asa and eliquis.             ·  Problem: CVA (cerebral vascular accident).  Plan: reported CT head negative at OSH    repeat CTH here with ?L lacunar infarct    MRA negative     MRI brain with Acute infarcts left thalamus and right cerebellum may be due to hypercoagulable state or cardioembolic source. No hemorrhage. Atrophy and moderate small vessel white matter ischemic changes.    eliquis and asa.         ·  Problem: Paroxysmal atrial fibrillation.  Plan: afib w/ rvr on admission - hr better controlled now    c/w diltiazem     metoprolol held due to bradycardia     chads2 vasc - - cont eliquis.         ·  Problem: Gastritis.  Plan: epigastric discomfort after eating     will start protonix daily    ?h/o gi bleed in past per son - will need to monitor h/h             ·  Problem: Hyperlipidemia, unspecified hyperlipidemia type.  Plan:     c/w high intensity statin.             Pt stable dc home with outpatient follow up with Neurology, Medicine Clinic and Cardiology Clinic.

## 2020-01-29 NOTE — PROGRESS NOTE ADULT - SUBJECTIVE AND OBJECTIVE BOX
CARDIOLOGY PROGRESS NOTE    Subjective/24 hour events:   - No overnight events.   - Denies chest pain, palpitations, SOB, lightheadedness, dizziness.    Telemetry: SB/SR    MEDICATIONS  (STANDING):  aspirin enteric coated 81 milliGRAM(s) Oral daily  atorvastatin 80 milliGRAM(s) Oral at bedtime  clopidogrel Tablet 75 milliGRAM(s) Oral daily  diltiazem    milliGRAM(s) Oral daily  heparin  Infusion. 500 Unit(s)/Hr (5 mL/Hr) IV Continuous <Continuous>  heparin  Injectable 3200 Unit(s) IV Push once  metoprolol tartrate 25 milliGRAM(s) Oral two times a day    MEDICATIONS  (PRN):  heparin  Injectable 3200 Unit(s) IV Push every 6 hours PRN For aPTT less than 40    Vital Signs Last 24 Hrs  T(C): 36.4 (2020 04:48), Max: 37.1 (2020 19:00)  T(F): 97.6 (2020 04:48), Max: 98.8 (2020 19:00)  HR: 55 (2020 04:48) (51 - 86)  BP: 116/70 (2020 04:48) (99/63 - 141/81)  BP(mean): 84 (2020 00:00) (74 - 102)  RR: 18 (2020 04:48) (18 - 42)  SpO2: 96% (2020 04:48) (92% - 99%)    I&O's Summary  2020 07:01  -  2020 07:00  --------------------------------------------------------  IN: 735.1 mL / OUT: 1350 mL / NET: -614.9 mL    Physical Exam:  General: No distress. Comfortable  HEENT: EOMI	  Neck: JVP not elevated  Chest: Clear to auscultation bilaterally  CV: RRR. Normal S1 and S2. No murmurs, rubs, or gallops. No edema.  Abdomen: Soft, non-distended, non-tender  Skin: No rashes, ecchymoses, or skin breakdown  Extremities: Warm.  Neuro: Alert and oriented x 3. No focal deficits.  Psych: Mood and affect appropriate    Labs:               11.0   8.48  )-----------( 230      ( 2020 01:33 )             34.2         137  |  102  |  30<H>  ----------------------------<  112<H>  3.9   |  22  |  1.22    Ca    8.9      2020 02:47  Phos  2.9       Mg     1.9         TPro  6.7  /  Alb  3.5  /  TBili  1.0  /  DBili  x   /  AST  54<H>  /  ALT  14  /  AlkPhos  91      PT/INR - ( 2020 02:47 )   PT: 24.6 sec;   INR: 2.11 ratio    PTT - ( 2020 01:33 )  PTT:62.5 sec    Lipid Profile: Total Cholesterol: 238, LDL: 160, HDL: 59, T  HgA1c: Hemoglobin A1C, Whole Blood: 5.4 % ( @ 22:34)    CARDIOVASCULAR DIAGNOSTIC TESTING:  [] Echocardiogram:  < from: Transthoracic Echocardiogram (20 @ 13:45) >  Conclusions:  The rhythm during this study was atrial fibrillation.  Normal left ventricular internal dimensions. Discrete basal  septal hypertrophy.  Low-normal left ventricular systolic function. Ejection  fraction varies due to irregular RR-intervals.  The mid-septum and apical septum appear akinetic.  The ascending aorta is severely dilated (adjusted for BSA),  with a diameter of 4.7 cm.  Moderate aortic regurgitation.

## 2020-01-29 NOTE — PROGRESS NOTE ADULT - SUBJECTIVE AND OBJECTIVE BOX
Subjective: Interval History - No events overnight. Spoke with patient and son at bedside, no complaints.     Objective:   Vital Signs Last 24 Hrs  T(C): 36.6 (29 Jan 2020 13:12), Max: 37.1 (28 Jan 2020 19:00)  T(F): 97.9 (29 Jan 2020 13:12), Max: 98.8 (28 Jan 2020 19:00)  HR: 55 (29 Jan 2020 13:12) (51 - 70)  BP: 96/57 (29 Jan 2020 13:12) (96/57 - 141/81)  BP(mean): 84 (29 Jan 2020 00:00) (74 - 102)  RR: 17 (29 Jan 2020 13:12) (17 - 42)  SpO2: 94% (29 Jan 2020 13:12) (92% - 99%)          General:  Constitutional: Female, appears stated age, in no apparent distress including pain    Neurological (>12):  MS: Awake, alert, oriented to person, place, situation, time. Normal affect. Follows all commands.  Language: Speech is clear, fluent with good repetition & comprehension    CNs: PERRL. EOMI no nystagmus, no diplopia. V1-3 intact to LT. well developed masseter muscles b/l. No facial asymmetry b/l, full eye closure strength b/l.     Motor: Normal muscle bulk & tone. No noticeable tremor or seizure. + RUE drift              Deltoid	Biceps	Triceps     R	4	4	4	4		  L	5	5	5	5    	H-Flex	H-ExtK-Flex	K-Ext	D-Flex	P-Flex  R	5	5	5	5	5	5	   L	5	5	5	5	5	5		     Sensation: Intact to LT throughout.     Coordination:  + FNF dysmetria in RUE    Other:  01-29    136  |  102  |  26<H>  ----------------------------<  100<H>  3.7   |  23  |  1.18    Ca    8.8      29 Jan 2020 08:48  Phos  2.7     01-29  Mg     2.1     01-29    TPro  6.4  /  Alb  3.4  /  TBili  0.6  /  DBili  x   /  AST  37  /  ALT  12  /  AlkPhos  84  01-29    LIVER FUNCTIONS - ( 29 Jan 2020 08:48 )  Alb: 3.4 g/dL / Pro: 6.4 g/dL / ALK PHOS: 84 U/L / ALT: 12 U/L / AST: 37 U/L / GGT: x                                 11.4   6.57  )-----------( 243      ( 29 Jan 2020 08:48 )             34.9       MEDICATIONS  (STANDING):  aspirin enteric coated 81 milliGRAM(s) Oral daily  atorvastatin 80 milliGRAM(s) Oral at bedtime  clopidogrel Tablet 75 milliGRAM(s) Oral daily  diltiazem    milliGRAM(s) Oral daily  heparin  Infusion. 500 Unit(s)/Hr (5 mL/Hr) IV Continuous <Continuous>  heparin  Injectable 3200 Unit(s) IV Push once  pantoprazole    Tablet 40 milliGRAM(s) Oral before breakfast    MEDICATIONS  (PRN):  heparin  Injectable 3200 Unit(s) IV Push every 6 hours PRN For aPTT less than 40    < from: MR Head No Cont (01.28.20 @ 17:37) >  EXAM:  MR BRAIN                            PROCEDURE DATE:  01/28/2020            INTERPRETATION:  CLINICAL INFORMATION: Follow-up lacunar infarct.    TECHNIQUE: Multiplanar multisequence MRI brain without contrast.    COMPARISON: CT head 1/27/2020. MRA brain 1/27/2020.    FINDINGS:    T2/FLAIR hyperintense foci in the left thalamus and right medial cerebellar hemisphere demonstrating restricted diffusion, consistent with acute infarcts. Bilaterality and supra and infratentorial locations may be due to hypercoagulable state, or cardioembolic source. No hemorrhagic transformation. No intraparenchymal hematoma, extra-axial fluid collection, hydrocephalus or midline shift.    Mild cerebral volume loss with prominence of ventricles and sulci, appropriate for patient's age. Moderate periventricular T2/FLAIR hyperintensities, likely sequela of chronic microvascular white matter ischemic changes. Small arachnoid cyst anterior to the left temporal lobe.    Major flow-voids at the base of the brain follow expected course and contour demonstrating patency.    The calvarium is intact. The visualized intraorbital compartments and tympanomastoid cavities appear free of acute disease. Left maxillary sinus mucosal polyp versus retention cyst.    IMPRESSION:    Acute infarcts left thalamus and right cerebellum may be due to hypercoagulable state or cardioembolic source. No hemorrhage. Atrophy and moderate small vessel white matter ischemic changes.    < end of copied text >

## 2020-01-29 NOTE — PROGRESS NOTE ADULT - SUBJECTIVE AND OBJECTIVE BOX
CARDIOLOGY PROGRESS NOTE    Subjective/24 hour events:   - No overnight events.   - Denies chest pain, palpitations, SOB, lightheadedness, dizziness.    Telemetry:    MEDICATIONS  (STANDING):  aspirin enteric coated 81 milliGRAM(s) Oral daily  atorvastatin 80 milliGRAM(s) Oral at bedtime  clopidogrel Tablet 75 milliGRAM(s) Oral daily  diltiazem    milliGRAM(s) Oral daily  heparin  Infusion. 500 Unit(s)/Hr (5 mL/Hr) IV Continuous <Continuous>  heparin  Injectable 3200 Unit(s) IV Push once  metoprolol tartrate 25 milliGRAM(s) Oral two times a day    MEDICATIONS  (PRN):  heparin  Injectable 3200 Unit(s) IV Push every 6 hours PRN For aPTT less than 40    Vital Signs Last 24 Hrs  T(C): 36.4 (29 Jan 2020 04:48), Max: 37.1 (28 Jan 2020 19:00)  T(F): 97.6 (29 Jan 2020 04:48), Max: 98.8 (28 Jan 2020 19:00)  HR: 55 (29 Jan 2020 04:48) (51 - 86)  BP: 116/70 (29 Jan 2020 04:48) (99/63 - 141/81)  BP(mean): 84 (29 Jan 2020 00:00) (74 - 102)  RR: 18 (29 Jan 2020 04:48) (18 - 42)  SpO2: 96% (29 Jan 2020 04:48) (92% - 99%)    I&O's Summary  28 Jan 2020 07:01  -  29 Jan 2020 07:00  --------------------------------------------------------  IN: 735.1 mL / OUT: 1350 mL / NET: -614.9 mL    Physical Exam:  General: No distress. Comfortable  HEENT: EOMI	  Neck: JVP not elevated  Chest: Clear to auscultation bilaterally  CV: RRR. Normal S1 and S2. No murmurs, rubs, or gallops. No edema.  Abdomen: Soft, non-distended, non-tender  Skin: No rashes, ecchymoses, or skin breakdown  Extremities: Warm.  Neuro: Alert and oriented x 3. No focal deficits.  Psych: Mood and affect appropriate    Labs:             11.0   8.48  )-----------( 230      ( 29 Jan 2020 01:33 )             34.2     01-28    137  |  102  |  30<H>  ----------------------------<  112<H>  3.9   |  22  |  1.22    Ca    8.9      28 Jan 2020 02:47  Phos  2.9     01-28  Mg     1.9     01-28    TPro  6.7  /  Alb  3.5  /  TBili  1.0  /  DBili  x   /  AST  54<H>  /  ALT  14  /  AlkPhos  91  01-28    PT/INR - ( 28 Jan 2020 02:47 )   PT: 24.6 sec;   INR: 2.11 ratio    PTT - ( 29 Jan 2020 01:33 )  PTT:62.5 sec CARDIOLOGY PROGRESS NOTE    Subjective/24 hour events:   - Transferred out of CCU.   - Denies chest pain, palpitations, SOB, lightheadedness, dizziness.    Telemetry: SB/SR    MEDICATIONS  (STANDING):  aspirin enteric coated 81 milliGRAM(s) Oral daily  atorvastatin 80 milliGRAM(s) Oral at bedtime  clopidogrel Tablet 75 milliGRAM(s) Oral daily  diltiazem    milliGRAM(s) Oral daily  heparin  Infusion. 500 Unit(s)/Hr (5 mL/Hr) IV Continuous <Continuous>  heparin  Injectable 3200 Unit(s) IV Push once  metoprolol tartrate 25 milliGRAM(s) Oral two times a day    MEDICATIONS  (PRN):  heparin  Injectable 3200 Unit(s) IV Push every 6 hours PRN For aPTT less than 40    Vital Signs Last 24 Hrs  T(C): 36.4 (29 Jan 2020 04:48), Max: 37.1 (28 Jan 2020 19:00)  T(F): 97.6 (29 Jan 2020 04:48), Max: 98.8 (28 Jan 2020 19:00)  HR: 55 (29 Jan 2020 04:48) (51 - 86)  BP: 116/70 (29 Jan 2020 04:48) (99/63 - 141/81)  BP(mean): 84 (29 Jan 2020 00:00) (74 - 102)  RR: 18 (29 Jan 2020 04:48) (18 - 42)  SpO2: 96% (29 Jan 2020 04:48) (92% - 99%)    I&O's Summary  28 Jan 2020 07:01  -  29 Jan 2020 07:00  --------------------------------------------------------  IN: 735.1 mL / OUT: 1350 mL / NET: -614.9 mL    Physical Exam:  General: No distress. Comfortable  HEENT: EOMI	  Neck: JVP not elevated  Chest: Clear to auscultation bilaterally  CV: RRR. Normal S1 and S2. No murmurs, rubs, or gallops. No edema.  Abdomen: Soft, non-distended, non-tender  Skin: No rashes, ecchymoses, or skin breakdown  Extremities: Warm.  Neuro: Alert and oriented x 3. No focal deficits.  Psych: Mood and affect appropriate    Labs:             11.0   8.48  )-----------( 230      ( 29 Jan 2020 01:33 )             34.2     01-28    137  |  102  |  30<H>  ----------------------------<  112<H>  3.9   |  22  |  1.22    Ca    8.9      28 Jan 2020 02:47  Phos  2.9     01-28  Mg     1.9     01-28    TPro  6.7  /  Alb  3.5  /  TBili  1.0  /  DBili  x   /  AST  54<H>  /  ALT  14  /  AlkPhos  91  01-28    PT/INR - ( 28 Jan 2020 02:47 )   PT: 24.6 sec;   INR: 2.11 ratio    PTT - ( 29 Jan 2020 01:33 )  PTT:62.5 sec CARDIOLOGY PROGRESS NOTE    Subjective/24 hour events:   - Transferred out of CCU.   - Denies chest pain, palpitations, SOB, lightheadedness, dizziness.    Telemetry: SB/SR    MEDICATIONS  (STANDING):  aspirin enteric coated 81 milliGRAM(s) Oral daily  atorvastatin 80 milliGRAM(s) Oral at bedtime  clopidogrel Tablet 75 milliGRAM(s) Oral daily  diltiazem    milliGRAM(s) Oral daily  heparin  Infusion. 500 Unit(s)/Hr (5 mL/Hr) IV Continuous <Continuous>  heparin  Injectable 3200 Unit(s) IV Push once  metoprolol tartrate 25 milliGRAM(s) Oral two times a day    MEDICATIONS  (PRN):  heparin  Injectable 3200 Unit(s) IV Push every 6 hours PRN For aPTT less than 40    Vital Signs Last 24 Hrs  T(C): 36.4 (29 Jan 2020 04:48), Max: 37.1 (28 Jan 2020 19:00)  T(F): 97.6 (29 Jan 2020 04:48), Max: 98.8 (28 Jan 2020 19:00)  HR: 55 (29 Jan 2020 04:48) (51 - 86)  BP: 116/70 (29 Jan 2020 04:48) (99/63 - 141/81)  BP(mean): 84 (29 Jan 2020 00:00) (74 - 102)  RR: 18 (29 Jan 2020 04:48) (18 - 42)  SpO2: 96% (29 Jan 2020 04:48) (92% - 99%)    I&O's Summary  28 Jan 2020 07:01  -  29 Jan 2020 07:00  --------------------------------------------------------  IN: 735.1 mL / OUT: 1350 mL / NET: -614.9 mL    Physical Exam:  General: No distress. Comfortable  HEENT: EOMI	  Neck: JVP not elevated  Chest: Clear to auscultation bilaterally  CV: RRR. Normal S1 and S2. No murmurs, rubs, or gallops. No edema.  Abdomen: Soft, non-distended, non-tender  Skin: No rashes, ecchymoses, or skin breakdown  Extremities: Warm.  Neuro: Alert and oriented x 3. No focal deficits.  Psych: Mood and affect appropriate    Labs:             11.0   8.48  )-----------( 230      ( 29 Jan 2020 01:33 )             34.2     01-28    137  |  102  |  30<H>  ----------------------------<  112<H>  3.9   |  22  |  1.22    Ca    8.9      28 Jan 2020 02:47  Phos  2.9     01-28  Mg     1.9     01-28    TPro  6.7  /  Alb  3.5  /  TBili  1.0  /  DBili  x   /  AST  54<H>  /  ALT  14  /  AlkPhos  91  01-28    PT/INR - ( 28 Jan 2020 02:47 )   PT: 24.6 sec;   INR: 2.11 ratio    PTT - ( 29 Jan 2020 01:33 )  PTT:62.5 sec    CARDIOVASCULAR TESTING  [ ] Echo:  < from: Transthoracic Echocardiogram (01.27.20 @ 13:45) >  Conclusions:  The rhythm during this study was atrial fibrillation.  Normal left ventricular internal dimensions. Discrete basal  septal hypertrophy.  Low-normal left ventricular systolic function. Ejection  fraction varies due to irregular RR-intervals.  The mid-septum and apical septum appear akinetic.  The ascending aorta is severely dilated (adjusted for BSA),  with a diameter of 4.7 cm.  Moderate aortic regurgitation. CARDIOLOGY PROGRESS NOTE    Subjective/24 hour events:   - Transferred out of CCU.   - Denies chest pain, palpitations, SOB, lightheadedness, dizziness.    Telemetry: SB/SR    MEDICATIONS  (STANDING):  aspirin enteric coated 81 milliGRAM(s) Oral daily  atorvastatin 80 milliGRAM(s) Oral at bedtime  clopidogrel Tablet 75 milliGRAM(s) Oral daily  diltiazem    milliGRAM(s) Oral daily  heparin  Infusion. 500 Unit(s)/Hr (5 mL/Hr) IV Continuous <Continuous>  heparin  Injectable 3200 Unit(s) IV Push once  metoprolol tartrate 25 milliGRAM(s) Oral two times a day    MEDICATIONS  (PRN):  heparin  Injectable 3200 Unit(s) IV Push every 6 hours PRN For aPTT less than 40    Vital Signs Last 24 Hrs  T(C): 36.4 (29 Jan 2020 04:48), Max: 37.1 (28 Jan 2020 19:00)  T(F): 97.6 (29 Jan 2020 04:48), Max: 98.8 (28 Jan 2020 19:00)  HR: 55 (29 Jan 2020 04:48) (51 - 86)  BP: 116/70 (29 Jan 2020 04:48) (99/63 - 141/81)  BP(mean): 84 (29 Jan 2020 00:00) (74 - 102)  RR: 18 (29 Jan 2020 04:48) (18 - 42)  SpO2: 96% (29 Jan 2020 04:48) (92% - 99%)    Physical Exam:  General: No distress. Comfortable  HEENT: EOMI	  Neck: JVP not elevated  Chest: Clear to auscultation bilaterally  CV: RRR. Normal S1 and S2. No murmurs, rubs, or gallops. No edema.  Abdomen: Soft, non-distended, non-tender  Skin: No rashes, ecchymoses, or skin breakdown  Extremities: Warm.  Neuro: Alert and oriented x 3. No focal deficits.  Psych: Mood and affect appropriate      I&O's Summary  28 Jan 2020 07:01  -  29 Jan 2020 07:00  --------------------------------------------------------  IN: 735.1 mL / OUT: 1350 mL / NET: -614.9 mL      Labs:             11.0   8.48  )-----------( 230      ( 29 Jan 2020 01:33 )             34.2     01-28  137  |  102  |  30<H>  ----------------------------<  112<H>  3.9   |  22  |  1.22    Ca    8.9      28 Jan 2020 02:47  Phos  2.9     01-28  Mg     1.9     01-28    TPro  6.7  /  Alb  3.5  /  TBili  1.0  /  DBili  x   /  AST  54<H>  /  ALT  14  /  AlkPhos  91  01-28    PT/INR - ( 28 Jan 2020 02:47 )   PT: 24.6 sec;   INR: 2.11 ratio    PTT - ( 29 Jan 2020 01:33 )  PTT:62.5 sec      Transthoracic Echocardiogram (01.27.20 @ 13:45) >  Conclusions:  The rhythm during this study was atrial fibrillation.  Normal left ventricular internal dimensions. Discrete basal septal hypertrophy.  Low-normal left ventricular systolic function. Ejection fraction varies due to irregular RR-intervals. The mid-septum and apical septum appear akinetic.   The ascending aorta is severely dilated (adjusted for BSA), with a diameter of 4.7 cm. Moderate aortic regurgitation.

## 2020-01-29 NOTE — PROGRESS NOTE ADULT - PROBLEM SELECTOR PLAN 3
c/w diltiazem   metoprolol held due to bradycardia -monitor on telemetry  chads2 vasc - - currently on heparin gtt - will need to restart coumadin if no plans for cath on this admission afib w/ rvr on admission - hr better controlled now  c/w diltiazem   metoprolol held due to bradycardia -monitor on telemetry  chads2 vasc - - currently on heparin gtt - will need to restart coumadin if no plans for cath on this admission

## 2020-01-29 NOTE — DISCHARGE NOTE PROVIDER - NSFOLLOWUPCLINICS_GEN_ALL_ED_FT
[facility]  Cardiology  [address] [place] [id number]  Phone: [phone]  Fax:

## 2020-01-29 NOTE — PROVIDER CONTACT NOTE (OTHER) - ACTION/TREATMENT ORDERED:
NP made aware. No further actions at this time. Continue to monitor patient.
will closely monitor the patient. R2 pad at bedside. and re-evaluate for cardiac meds in am.

## 2020-01-29 NOTE — DISCHARGE NOTE PROVIDER - PROVIDER TOKENS
PROVIDER:[TOKEN:[[id number]],FOLLOWUP:[1 week],ESTABLISHEDPATIENT:[T]] FREE:[LAST:[Clinic],FIRST:[Medicine],PHONE:[phone]],FAX:[(   )    -],ADDRESS:[[address] [place] [address]]],PROVIDER:[TOKEN:[[id number]]] PROVIDER:[TOKEN:[[id number]]],FREE:[LAST:[Ale],FIRST:[[place]ie],PHONE:[phone]],FAX:[(   )    -],ADDRESS:[[facility] [place] [address]]]

## 2020-01-29 NOTE — DISCHARGE NOTE PROVIDER - NSDCMRMEDTOKEN_GEN_ALL_CORE_FT
dilTIAZem 240 mg/24 hours oral tablet, extended release: 1 tab(s) orally once a day  docusate sodium 100 mg oral capsule: 1 cap(s) orally 2 times a day  metoprolol tartrate 25 mg oral tablet: 1 tab(s) orally every 8 hours  omeprazole 20 mg oral delayed release capsule: 1 cap(s) orally once a day  warfarin 2.5 mg oral tablet: 1 tab(s) orally once a day apixaban 5 mg oral tablet: 1 tab(s) orally every 12 hours  aspirin 81 mg oral delayed release tablet: 1 tab(s) orally once a day  atorvastatin 80 mg oral tablet: 1 tab(s) orally once a day (at bedtime)  dilTIAZem 240 mg/24 hours oral capsule, extended release: 1 cap(s) orally once a day  docusate sodium 100 mg oral capsule: 1 cap(s) orally 2 times a day  omeprazole 20 mg oral delayed release capsule: 1 cap(s) orally once a day

## 2020-01-29 NOTE — PROGRESS NOTE ADULT - ASSESSMENT
74Y F w/ PMH Afib on coumadin, HTN, gastritis who initially presented for RUE weakness, found to have NSTEMI and transferred to [facility] for cardiac eval. CTH with possible L thalamic infarct. MRI confirmed acute L thalamic infarct and also demonstrated acute R cerebellar infarct. On exam, she has gait ataxia, RUE weakness and RUE dysmetria. Cardiac cath is currently deferred. She is on heparin drip.      Impression: R hemiparesis secondary to L thalamic infarct, R dysmetria and ataxic hemiparesis secondary to R cerebellar infarct. Etiology possibly cardioembolic (although infarct size is small but in 2 different vascular distributions), vs small vessel disease (although no confirmation of atherosclerosis on imaging given motion limited study), however patient has risk factors for small vessel disease.     Recommend:  - no contraindications to cardiac catheterization from neurologic perspective  - continue anticoagulation for atrial fibrillation, consider switching to Eliquis if no contraindications  - for secondary stroke prevention, patient will need antiplatelet coverage but does not require triple therapy. Would stop Plavix if ok from Cardiac perspective (given no significant intracranial atherosclerosis) and continue ASA 81mg.   - will need further outpatient vessel imaging  - PT/OT    Upon discharge, patient can follow up with Dr. Dedrick Cho at [phone]

## 2020-01-29 NOTE — DISCHARGE NOTE PROVIDER - NSDCFUADDAPPT_GEN_ALL_CORE_FT
Please follow up with neurologist, Dr Cho 1-2 weeks after discharge. Please call the office to schedule an appointment. Please follow up with neurologist, Dr Cho 1-2 weeks after discharge. Please call the office to schedule an appointment.  Follow up at [facility] and Cardiology [facility] as an outpatient.   Follow-up with cardiology and have coronary angiogram in 6-8 weeks. Please follow up with your primary doctor in [place], Dr. Ale aCstro in 1-2 weeks    Please follow up with neurologist, Dr Cho 1-2 weeks after discharge. Please call the office to schedule an appointment.    Please make an appointment to follow with our Cardiology Clinic as an outpatient.   Follow-up with cardiology and have coronary angiogram in 6-8 weeks.

## 2020-01-29 NOTE — DISCHARGE NOTE PROVIDER - CARE PROVIDER_API CALL
Dedrick Cho (DO)  Neurology; Vascular Neurology  [address] [facility] [address]  [place] [address]  Phone: [phone]  Fax: [phone]  Established Patient  Follow Up Time: 1 week [facility], [place] [address]  Phone: [phone]  Fax: (   )    -  Follow Up Time:     Dedrick Cho (DO)  Neurology; Vascular Neurology  [address] [facility] [address]  [place] [address]  Phone: [phone]  Fax: [phone]  Follow Up Time: Dedrick Cho (DO)  Neurology; Vascular Neurology  [address] [facility] [address]  [place] [address]  Phone: [phone]  Fax: [phone]  Follow Up Time:     Gloria Aguilera  [facility] [place] [address]  Phone: [phone]  Fax: (   )    -  Follow Up Time:

## 2020-01-30 LAB
ANION GAP SERPL CALC-SCNC: 14 MMOL/L — SIGNIFICANT CHANGE UP (ref 5–17)
APTT BLD: 83.8 SEC — HIGH (ref 27.5–36.3)
BUN SERPL-MCNC: 25 MG/DL — HIGH (ref 7–23)
CALCIUM SERPL-MCNC: 9.1 MG/DL — SIGNIFICANT CHANGE UP (ref 8.4–10.5)
CHLORIDE SERPL-SCNC: 102 MMOL/L — SIGNIFICANT CHANGE UP (ref 96–108)
CO2 SERPL-SCNC: 23 MMOL/L — SIGNIFICANT CHANGE UP (ref 22–31)
CREAT SERPL-MCNC: 1.15 MG/DL — SIGNIFICANT CHANGE UP (ref 0.5–1.3)
GLUCOSE SERPL-MCNC: 104 MG/DL — HIGH (ref 70–99)
HCT VFR BLD CALC: 35.8 % — SIGNIFICANT CHANGE UP (ref 34.5–45)
HGB BLD-MCNC: 12 G/DL — SIGNIFICANT CHANGE UP (ref 11.5–15.5)
MCHC RBC-ENTMCNC: 30.9 PG — SIGNIFICANT CHANGE UP (ref 27–34)
MCHC RBC-ENTMCNC: 33.5 GM/DL — SIGNIFICANT CHANGE UP (ref 32–36)
MCV RBC AUTO: 92.3 FL — SIGNIFICANT CHANGE UP (ref 80–100)
NRBC # BLD: 0 /100 WBCS — SIGNIFICANT CHANGE UP (ref 0–0)
PLATELET # BLD AUTO: 294 K/UL — SIGNIFICANT CHANGE UP (ref 150–400)
POTASSIUM SERPL-MCNC: 3.8 MMOL/L — SIGNIFICANT CHANGE UP (ref 3.5–5.3)
POTASSIUM SERPL-SCNC: 3.8 MMOL/L — SIGNIFICANT CHANGE UP (ref 3.5–5.3)
RBC # BLD: 3.88 M/UL — SIGNIFICANT CHANGE UP (ref 3.8–5.2)
RBC # FLD: 14.6 % — HIGH (ref 10.3–14.5)
SODIUM SERPL-SCNC: 139 MMOL/L — SIGNIFICANT CHANGE UP (ref 135–145)
WBC # BLD: 7.57 K/UL — SIGNIFICANT CHANGE UP (ref 3.8–10.5)
WBC # FLD AUTO: 7.57 K/UL — SIGNIFICANT CHANGE UP (ref 3.8–10.5)

## 2020-01-30 PROCEDURE — 99232 SBSQ HOSP IP/OBS MODERATE 35: CPT | Mod: GC

## 2020-01-30 PROCEDURE — 99233 SBSQ HOSP IP/OBS HIGH 50: CPT

## 2020-01-30 RX ORDER — APIXABAN 2.5 MG/1
5 TABLET, FILM COATED ORAL EVERY 12 HOURS
Refills: 0 | Status: DISCONTINUED | OUTPATIENT
Start: 2020-01-30 | End: 2020-01-31

## 2020-01-30 RX ADMIN — HEPARIN SODIUM 0 UNIT(S)/HR: 5000 INJECTION INTRAVENOUS; SUBCUTANEOUS at 08:08

## 2020-01-30 RX ADMIN — HEPARIN SODIUM 500 UNIT(S)/HR: 5000 INJECTION INTRAVENOUS; SUBCUTANEOUS at 08:40

## 2020-01-30 RX ADMIN — Medication 240 MILLIGRAM(S): at 05:17

## 2020-01-30 RX ADMIN — APIXABAN 5 MILLIGRAM(S): 2.5 TABLET, FILM COATED ORAL at 21:20

## 2020-01-30 RX ADMIN — PANTOPRAZOLE SODIUM 40 MILLIGRAM(S): 20 TABLET, DELAYED RELEASE ORAL at 05:17

## 2020-01-30 RX ADMIN — APIXABAN 5 MILLIGRAM(S): 2.5 TABLET, FILM COATED ORAL at 09:30

## 2020-01-30 RX ADMIN — ATORVASTATIN CALCIUM 80 MILLIGRAM(S): 80 TABLET, FILM COATED ORAL at 21:20

## 2020-01-30 RX ADMIN — Medication 81 MILLIGRAM(S): at 09:32

## 2020-01-30 NOTE — PROGRESS NOTE ADULT - PROBLEM SELECTOR PLAN 3
afib w/ rvr on admission - hr better controlled now  c/w diltiazem   metoprolol held due to bradycardia -monitor on telemetry  chads2 vasc - - cont eliquis

## 2020-01-30 NOTE — PROGRESS NOTE ADULT - ASSESSMENT
74 year old Maltese speaking female with CAD, AFib, HTN, Aortic Aneurysm (4.5cm).  Now a/w CVA to OSH, with course c/b AFib with RVR and NSTEMI Type 2; transferred to [facility].  Echo with low-normal LVEF and akinesis mid-apical septum  Neuro with no contraindication to AC.    Recommendations:  1. Continue ASA 81mg daily and Atorvastatin 80mg qhs  2. Discontinue heparin gtt. Start Eliquis 5mg BID.   3. Cardizem CD 240mg daily  4. Case discussed with Interventional Cardiology. Since CK-MB was low and there is an increased risk of CVA with LHC in setting of acute CVA, coronary angiogram to be deferred at this time; continue medical treatment of heart disease. Patient should follow-up with cardiology and have coronary angiogram in 6-8 weeks. Stable for discharge from cardiac standpoint.    Arthur Hurley M.D.  Cardiology Fellow  [phone]  For all Cardiology service contact information, go to [email or web address] and use [email or web address] to login. 74 year old Thai speaking female with CAD, AFib, HTN, Aortic Aneurysm (4.5cm).  Now a/w CVA to OSH, with course c/b AFib with RVR and NSTEMI Type 2; transferred to [facility].  Echo with low-normal LVEF and akinesis mid-apical septum  Neuro with no contraindication to AC.    Recommendations:  1. Continue ASA 81mg daily and atorvastatin 80mg qhs  2. Discontinue heparin gtt. Start Eliquis 5mg BID.   3. Cardizem CD 240mg daily  4. Case discussed with Interventional Cardiology. Since CK-MB was low and there is an increased risk of CVA with LHC in setting of acute CVA, coronary angiogram to be deferred at this time; continue medical treatment of heart disease. Patient should follow-up with cardiology and have coronary angiogram in 6-8 weeks. Stable for discharge from cardiac standpoint.    Arthur Hurley M.D.  Cardiology Fellow  [phone]    Lyle Lowe M.D.  Cardiology Attending, Consult Service  Beeper     For all Cardiology service contact information, go to [email or web address] and use "[email or web address]" to login.

## 2020-01-30 NOTE — PROGRESS NOTE ADULT - SUBJECTIVE AND OBJECTIVE BOX
CARDIOLOGY PROGRESS NOTE    Subjective/24 hour events:   - No overnight events.   - Denies chest pain, palpitations, SOB, lightheadedness, dizziness.    Telemetry: SR 60-70    MEDICATIONS  (STANDING):  aspirin enteric coated 81 milliGRAM(s) Oral daily  atorvastatin 80 milliGRAM(s) Oral at bedtime  diltiazem    milliGRAM(s) Oral daily  heparin  Infusion. 500 Unit(s)/Hr (5 mL/Hr) IV Continuous <Continuous>  heparin  Injectable 3200 Unit(s) IV Push once  pantoprazole    Tablet 40 milliGRAM(s) Oral before breakfast    MEDICATIONS  (PRN):  heparin  Injectable 3200 Unit(s) IV Push every 6 hours PRN For aPTT less than 40    Vital Signs Last 24 Hrs  T(C): 36.6 (30 Jan 2020 05:24), Max: 36.7 (29 Jan 2020 20:07)  T(F): 97.8 (30 Jan 2020 05:24), Max: 98.1 (29 Jan 2020 20:07)  HR: 62 (30 Jan 2020 05:24) (55 - 67)  BP: 122/70 (30 Jan 2020 05:24) (96/57 - 124/78)  BP(mean): --  RR: 18 (30 Jan 2020 05:24) (17 - 18)  SpO2: 93% (30 Jan 2020 05:24) (93% - 94%)    I&O's Summary  29 Jan 2020 07:01  -  30 Jan 2020 07:00  --------------------------------------------------------  IN: 425 mL / OUT: 250 mL / NET: 175 mL    Physical Exam:  General: No distress. Comfortable  HEENT: EOMI	  Neck: JVP not elevated  Chest: Clear to auscultation bilaterally  CV: RRR. Normal S1 and S2. No murmurs, rubs, or gallops. No edema.  Abdomen: Soft, non-distended, non-tender  Skin: No rashes, ecchymoses, or skin breakdown  Extremities: Warm.  Neuro: Alert and oriented x 3. No focal deficits.  Psych: Mood and affect appropriate    Labs:                11.4   6.57  )-----------( 243      ( 29 Jan 2020 08:48 )             34.9     01-29    136  |  102  |  26<H>  ----------------------------<  100<H>  3.7   |  23  |  1.18    Ca    8.8      29 Jan 2020 08:48  Phos  2.7     01-29  Mg     2.1     01-29    TPro  6.4  /  Alb  3.4  /  TBili  0.6  /  DBili  x   /  AST  37  /  ALT  12  /  AlkPhos  84  01-29    PTT - ( 29 Jan 2020 21:50 )  PTT:55.3 sec CARDIOLOGY PROGRESS NOTE    Subjective/24 hour events:   - No overnight events.   - Denies chest pain, palpitations, SOB, lightheadedness, dizziness.    Telemetry: SR 60-70    MEDICATIONS  (STANDING):  aspirin enteric coated 81 milliGRAM(s) Oral daily  atorvastatin 80 milliGRAM(s) Oral at bedtime  diltiazem    milliGRAM(s) Oral daily  heparin  Infusion. 500 Unit(s)/Hr (5 mL/Hr) IV Continuous <Continuous>  heparin  Injectable 3200 Unit(s) IV Push once  pantoprazole    Tablet 40 milliGRAM(s) Oral before breakfast    MEDICATIONS  (PRN):  heparin  Injectable 3200 Unit(s) IV Push every 6 hours PRN For aPTT less than 40    Vital Signs Last 24 Hrs  T(C): 36.6 (30 Jan 2020 05:24), Max: 36.7 (29 Jan 2020 20:07)  T(F): 97.8 (30 Jan 2020 05:24), Max: 98.1 (29 Jan 2020 20:07)  HR: 62 (30 Jan 2020 05:24) (55 - 67)  BP: 122/70 (30 Jan 2020 05:24) (96/57 - 124/78)  RR: 18 (30 Jan 2020 05:24) (17 - 18)  SpO2: 93% (30 Jan 2020 05:24) (93% - 94%)    Physical Exam:  General: No distress. Comfortable  HEENT: EOMI	  Neck: JVP not elevated  Chest: Clear to auscultation bilaterally  CV: RRR. Normal S1 and S2. No murmurs, rubs, or gallops. No edema.  Abdomen: Soft, non-distended, non-tender  Skin: No rashes, ecchymoses, or skin breakdown  Extremities: Warm.  Neuro: Alert and oriented x 3. No focal deficits.  Psych: Mood and affect appropriate      I&O's Summary  29 Jan 2020 07:01  -  30 Jan 2020 07:00  --------------------------------------------------------  IN: 425 mL / OUT: 250 mL / NET: 175 mL      Labs:                11.4   6.57  )-----------( 243      ( 29 Jan 2020 08:48 )             34.9     01-29  136  |  102  |  26<H>  ----------------------------<  100<H>  3.7   |  23  |  1.18    Ca    8.8      29 Jan 2020 08:48  Phos  2.7     01-29  Mg     2.1     01-29    TPro  6.4  /  Alb  3.4  /  TBili  0.6  /  DBili  x   /  AST  37  /  ALT  12  /  AlkPhos  84  01-29    PTT - ( 29 Jan 2020 21:50 )  PTT:55.3 sec

## 2020-01-30 NOTE — PROGRESS NOTE ADULT - NSHPATTENDINGPLANDISCUSS_GEN_ALL_CORE
cardiology fellow; patient seen and examined.       I was physically present for the key portions of the evaluation and management (E/M) service provided.    I agree with the above history, physical, and plan which I have reviewed and edited where appropriate.
cardiology fellow; patient seen and examined.       I was physically present for the key portions of the evaluation and management (E/M) service provided.    I agree with the above history, physical, and plan which I have reviewed and edited where appropriate.
Dr. Chavis

## 2020-01-30 NOTE — PROGRESS NOTE ADULT - ASSESSMENT
77 year old F w/ pmh of HTN, HLD, aortic aneurysm, AF who presented to [facility] w/ HTN, Af w/ RVR and new RUE weakness concerning for stroke complicated by chest pain w/ positive EKGs consistent w/ NSTEMI

## 2020-01-31 VITALS
TEMPERATURE: 98 F | RESPIRATION RATE: 18 BRPM | DIASTOLIC BLOOD PRESSURE: 70 MMHG | HEART RATE: 66 BPM | OXYGEN SATURATION: 95 % | SYSTOLIC BLOOD PRESSURE: 118 MMHG

## 2020-01-31 LAB
HCT VFR BLD CALC: 34.7 % — SIGNIFICANT CHANGE UP (ref 34.5–45)
HGB BLD-MCNC: 11.4 G/DL — LOW (ref 11.5–15.5)
MCHC RBC-ENTMCNC: 30.9 PG — SIGNIFICANT CHANGE UP (ref 27–34)
MCHC RBC-ENTMCNC: 32.9 GM/DL — SIGNIFICANT CHANGE UP (ref 32–36)
MCV RBC AUTO: 94 FL — SIGNIFICANT CHANGE UP (ref 80–100)
NRBC # BLD: 0 /100 WBCS — SIGNIFICANT CHANGE UP (ref 0–0)
PLATELET # BLD AUTO: 270 K/UL — SIGNIFICANT CHANGE UP (ref 150–400)
RBC # BLD: 3.69 M/UL — LOW (ref 3.8–5.2)
RBC # FLD: 14.6 % — HIGH (ref 10.3–14.5)
WBC # BLD: 6.54 K/UL — SIGNIFICANT CHANGE UP (ref 3.8–10.5)
WBC # FLD AUTO: 6.54 K/UL — SIGNIFICANT CHANGE UP (ref 3.8–10.5)

## 2020-01-31 PROCEDURE — 97530 THERAPEUTIC ACTIVITIES: CPT

## 2020-01-31 PROCEDURE — 80053 COMPREHEN METABOLIC PANEL: CPT

## 2020-01-31 PROCEDURE — 83036 HEMOGLOBIN GLYCOSYLATED A1C: CPT

## 2020-01-31 PROCEDURE — 70551 MRI BRAIN STEM W/O DYE: CPT

## 2020-01-31 PROCEDURE — 99239 HOSP IP/OBS DSCHRG MGMT >30: CPT

## 2020-01-31 PROCEDURE — 83605 ASSAY OF LACTIC ACID: CPT

## 2020-01-31 PROCEDURE — 85610 PROTHROMBIN TIME: CPT

## 2020-01-31 PROCEDURE — 71045 X-RAY EXAM CHEST 1 VIEW: CPT

## 2020-01-31 PROCEDURE — 86901 BLOOD TYPING SEROLOGIC RH(D): CPT

## 2020-01-31 PROCEDURE — 97110 THERAPEUTIC EXERCISES: CPT

## 2020-01-31 PROCEDURE — 70450 CT HEAD/BRAIN W/O DYE: CPT

## 2020-01-31 PROCEDURE — 84295 ASSAY OF SERUM SODIUM: CPT

## 2020-01-31 PROCEDURE — 93005 ELECTROCARDIOGRAM TRACING: CPT

## 2020-01-31 PROCEDURE — 82803 BLOOD GASES ANY COMBINATION: CPT

## 2020-01-31 PROCEDURE — 80061 LIPID PANEL: CPT

## 2020-01-31 PROCEDURE — 84100 ASSAY OF PHOSPHORUS: CPT

## 2020-01-31 PROCEDURE — 97162 PT EVAL MOD COMPLEX 30 MIN: CPT

## 2020-01-31 PROCEDURE — 86850 RBC ANTIBODY SCREEN: CPT

## 2020-01-31 PROCEDURE — 85027 COMPLETE CBC AUTOMATED: CPT

## 2020-01-31 PROCEDURE — 82947 ASSAY GLUCOSE BLOOD QUANT: CPT

## 2020-01-31 PROCEDURE — 96374 THER/PROPH/DIAG INJ IV PUSH: CPT

## 2020-01-31 PROCEDURE — 84484 ASSAY OF TROPONIN QUANT: CPT

## 2020-01-31 PROCEDURE — 84132 ASSAY OF SERUM POTASSIUM: CPT

## 2020-01-31 PROCEDURE — 82553 CREATINE MB FRACTION: CPT

## 2020-01-31 PROCEDURE — 97165 OT EVAL LOW COMPLEX 30 MIN: CPT

## 2020-01-31 PROCEDURE — 83735 ASSAY OF MAGNESIUM: CPT

## 2020-01-31 PROCEDURE — 70544 MR ANGIOGRAPHY HEAD W/O DYE: CPT

## 2020-01-31 PROCEDURE — 96375 TX/PRO/DX INJ NEW DRUG ADDON: CPT

## 2020-01-31 PROCEDURE — 82550 ASSAY OF CK (CPK): CPT

## 2020-01-31 PROCEDURE — 86900 BLOOD TYPING SEROLOGIC ABO: CPT

## 2020-01-31 PROCEDURE — 84443 ASSAY THYROID STIM HORMONE: CPT

## 2020-01-31 PROCEDURE — 85014 HEMATOCRIT: CPT

## 2020-01-31 PROCEDURE — 99291 CRITICAL CARE FIRST HOUR: CPT | Mod: 25

## 2020-01-31 PROCEDURE — 82435 ASSAY OF BLOOD CHLORIDE: CPT

## 2020-01-31 PROCEDURE — 82330 ASSAY OF CALCIUM: CPT

## 2020-01-31 PROCEDURE — 85730 THROMBOPLASTIN TIME PARTIAL: CPT

## 2020-01-31 PROCEDURE — 80048 BASIC METABOLIC PNL TOTAL CA: CPT

## 2020-01-31 RX ORDER — APIXABAN 2.5 MG/1
1 TABLET, FILM COATED ORAL
Qty: 60 | Refills: 0
Start: 2020-01-31 | End: 2020-02-29

## 2020-01-31 RX ORDER — DILTIAZEM HCL 120 MG
1 CAPSULE, EXT RELEASE 24 HR ORAL
Qty: 0 | Refills: 0 | DISCHARGE

## 2020-01-31 RX ORDER — ATORVASTATIN CALCIUM 80 MG/1
1 TABLET, FILM COATED ORAL
Qty: 30 | Refills: 0
Start: 2020-01-31 | End: 2020-02-29

## 2020-01-31 RX ORDER — ASPIRIN/CALCIUM CARB/MAGNESIUM 324 MG
1 TABLET ORAL
Qty: 30 | Refills: 0
Start: 2020-01-31 | End: 2020-02-29

## 2020-01-31 RX ORDER — WARFARIN SODIUM 2.5 MG/1
1 TABLET ORAL
Qty: 0 | Refills: 0 | DISCHARGE

## 2020-01-31 RX ORDER — METOPROLOL TARTRATE 50 MG
1 TABLET ORAL
Qty: 0 | Refills: 0 | DISCHARGE

## 2020-01-31 RX ORDER — DILTIAZEM HCL 120 MG
1 CAPSULE, EXT RELEASE 24 HR ORAL
Qty: 0 | Refills: 0 | DISCHARGE
Start: 2020-01-31

## 2020-01-31 RX ADMIN — APIXABAN 5 MILLIGRAM(S): 2.5 TABLET, FILM COATED ORAL at 09:31

## 2020-01-31 RX ADMIN — PANTOPRAZOLE SODIUM 40 MILLIGRAM(S): 20 TABLET, DELAYED RELEASE ORAL at 05:12

## 2020-01-31 RX ADMIN — Medication 81 MILLIGRAM(S): at 09:31

## 2020-01-31 RX ADMIN — Medication 240 MILLIGRAM(S): at 05:12

## 2020-01-31 NOTE — PROGRESS NOTE ADULT - SUBJECTIVE AND OBJECTIVE BOX
[facility] Division of Hospital Medicine  Nayeli Weiner MD  Pager (M-F, 8A-5P): [phone]  Other Times:  [phone]    Patient is a 74y old  Female who presents with a chief complaint of Transferred for NSTEMI (30 Jan 2020 20:42)      SUBJECTIVE / OVERNIGHT EVENTS:    feeling well today. no specific complaints. denies cp, sob. tolerating po.   ready for dc    ADDITIONAL REVIEW OF SYSTEMS:    MEDICATIONS  (STANDING):  apixaban 5 milliGRAM(s) Oral every 12 hours  aspirin enteric coated 81 milliGRAM(s) Oral daily  atorvastatin 80 milliGRAM(s) Oral at bedtime  diltiazem    milliGRAM(s) Oral daily  pantoprazole    Tablet 40 milliGRAM(s) Oral before breakfast    MEDICATIONS  (PRN):      CAPILLARY BLOOD GLUCOSE        I&O's Summary    30 Jan 2020 07:01  -  31 Jan 2020 07:00  --------------------------------------------------------  IN: 480 mL / OUT: 500 mL / NET: -20 mL    31 Jan 2020 07:01  -  31 Jan 2020 13:22  --------------------------------------------------------  IN: 0 mL / OUT: 350 mL / NET: -350 mL        PHYSICAL EXAM:  Vital Signs Last 24 Hrs  T(C): 36.2 (31 Jan 2020 05:23), Max: 36.8 (31 Jan 2020 00:33)  T(F): 97.2 (31 Jan 2020 05:23), Max: 98.3 (31 Jan 2020 00:33)  HR: 70 (31 Jan 2020 05:23) (62 - 70)  BP: 129/73 (31 Jan 2020 05:23) (103/67 - 129/73)  BP(mean): --  RR: 18 (31 Jan 2020 05:23) (18 - 18)  SpO2: 95% (31 Jan 2020 05:23) (92% - 95%)  CONSTITUTIONAL: NAD, well-developed, well-groomed  NECK: Supple, no palpable masses; no thyromegaly  RESPIRATORY: Normal respiratory effort; lungs are clear to auscultation bilaterally  CARDIOVASCULAR: Regular rate and rhythm, normal S1 and S2, no murmur/rub/gallop; No lower extremity edema; Peripheral pulses are 2+ bilaterally  ABDOMEN: Nontender to palpation, normoactive bowel sounds, no rebound/guarding; No hepatosplenomegaly  MUSCULOSKELETAL:  Normal gait; no clubbing or cyanosis of digits; no joint swelling or tenderness to palpation    LABS:                        11.4   6.54  )-----------( 270      ( 31 Jan 2020 07:04 )             34.7     01-30    139  |  102  |  25<H>  ----------------------------<  104<H>  3.8   |  23  |  1.15    Ca    9.1      30 Jan 2020 07:05      PTT - ( 30 Jan 2020 07:06 )  PTT:83.8 sec      COORDINATION OF CARE:  Care Discussed with Consultants/Other Providers [Y/N]: yes  Prior or Outpatient Records Reviewed [Y/N]: yes

## 2020-01-31 NOTE — PROGRESS NOTE ADULT - PROBLEM SELECTOR PLAN 1
Troponin peak 960  c/w asa, statin  deferring angiogram for 6-8 weeks due to risk of CVA  no plavix 2/2 pt being on asa and eliquis  ACE can be started as outpatient following angiogram
Troponin peak 960  c/w asa, plavix   heparin gtt for 48 hours per cards  ?deferring angiogram for 6-8 weeks due to risk of CVA - will follow up MRI brain
Heparin gtt  ASA, Plavix, Lipitor  DASH Diet  Lifestyle change education  Await neuro clearance for possible Cath
Troponin peak 960  c/w asa, statin  deferring angiogram for 6-8 weeks due to risk of CVA  no plavix 2/2 pt being on asa and eliquis

## 2020-01-31 NOTE — PHARMACOTHERAPY INTERVENTION NOTE - COMMENTS
vivo to run free 30 day coupon for eliquis  other medications $11.73 w/ clinic discount
d/w son at bedside

## 2020-01-31 NOTE — PROGRESS NOTE ADULT - PROBLEM SELECTOR PLAN 6
dvt proph - eliquis  gi proph - protonix  dispo- home with home PT
dvt proph - eliquis  gi proph - protonix  dispo- home with home PT
dvt proph - on heparin gtt  gi proph - protonix

## 2020-01-31 NOTE — PROGRESS NOTE ADULT - PROBLEM SELECTOR PLAN 2
reported CT head negative at [facility]  repeat CTH here with ?L lacunar infarct  MRA negative   MRI brain with Acute infarcts left thalamus and right cerebellum may be due to hypercoagulable state or cardioembolic source. No hemorrhage. Atrophy and moderate small vessel white matter ischemic changes.  Neurology follow up- no overt contraindication to cath  eliquis and asa
reported CT head negative at OSH  repeat CTH here with ?L lacunar infarct  MRA negative   awaiting MRI brain results r/o CVA  Neurology follow up
Lacunar infarct  MRI Head
reported CT head negative at [facility]SH  repeat CTH here with ?L lacunar infarct  MRA negative   MRI brain with Acute infarcts left thalamus and right cerebellum may be due to hypercoagulable state or cardioembolic source. No hemorrhage. Atrophy and moderate small vessel white matter ischemic changes.  Neurology follow up- no overt contraindication to cath  eliquis and asa

## 2020-01-31 NOTE — PROGRESS NOTE ADULT - ATTENDING COMMENTS
Patient is seen and examined with fellow, NP and the CCU house-staff. I agree with the history, physical and the assessment and plan.  nstemi with TIA  will obtain neuro input regaring repeat CT head before possible cath  on DAPT and hep gtt for NSTEMI  TTE results noted
case discussed with pt and his son at bedside. likely dc tomorrow
Dr. SHAYLA NielsenThe [facility]  [phone]
case discussed with pt and his son at bedside. dc planning home today.  will make an appt at [facility] as pt does not have a permanent PMD  dc planning time 50 mins

## 2020-01-31 NOTE — PROGRESS NOTE ADULT - PROBLEM SELECTOR PROBLEM 3
Paroxysmal atrial fibrillation
Paroxysmal atrial fibrillation
Atrial fibrillation
Paroxysmal atrial fibrillation

## 2020-01-31 NOTE — PROGRESS NOTE ADULT - PROBLEM SELECTOR PLAN 4
epigastric discomfort after eating   cont protonix- on omeprazole as outpatient  ?h/o gi bleed in past per son - will need to monitor h/h (stable now)
epigastric discomfort after eating   will start protonix daily  ?h/o gi bleed in past per son - will need to monitor h/h (stable now)
epigastric discomfort after eating   will start protonix daily  ?h/o gi bleed in past per son - will need to monitor h/h (stable now)

## 2020-02-11 PROBLEM — K29.70 GASTRITIS, UNSPECIFIED, WITHOUT BLEEDING: Chronic | Status: ACTIVE | Noted: 2020-01-27

## 2020-02-11 PROBLEM — E78.5 HYPERLIPIDEMIA, UNSPECIFIED: Chronic | Status: ACTIVE | Noted: 2020-01-27

## 2020-02-11 PROBLEM — I25.10 ATHEROSCLEROTIC HEART DISEASE OF NATIVE CORONARY ARTERY WITHOUT ANGINA PECTORIS: Chronic | Status: ACTIVE | Noted: 2020-01-27

## 2020-02-11 PROBLEM — I48.91 UNSPECIFIED ATRIAL FIBRILLATION: Chronic | Status: ACTIVE | Noted: 2020-01-27

## 2020-02-18 ENCOUNTER — APPOINTMENT (OUTPATIENT)
Dept: CARDIOLOGY | Facility: HOSPITAL | Age: 75
End: 2020-02-18

## 2020-02-18 PROBLEM — Z00.00 ENCOUNTER FOR PREVENTIVE HEALTH EXAMINATION: Status: ACTIVE | Noted: 2020-02-18

## 2020-10-12 NOTE — DISCHARGE NOTE NURSING/CASE MANAGEMENT/SOCIAL WORK - NSDCPNINST_GEN_ALL_CORE
Please assist pt with medical records request  Thank you any chest pain shortness of breath bleeding go emergency room

## 2021-05-14 NOTE — DISCHARGE NOTE PROVIDER - NS AS DC PROVIDER CONTACT Y/N MULTI
[FreeTextEntry1] : # HTN now better controlled. Labile and uncontrolled at home.\par * Increase losartan to 25/50.\par * Recheck labs. \par * Will consider ABPM. \par * The patient's blood pressure was checked with the [employer] [id number] using the SPRINT trial protocol after sitting quietly in an empty room with arm supported, back supported, and feet on the floor for 5 minutes. The average of 3 readings were taken.\par * A counseling information sheet has been given (currently or previously, in-person or electronically). All their questions were answered.\par * The patient has been counseled to check their BP at home with an automatic arm cuff, write down the readings, and reach me directly on the phone immediately if they are persistently > 180 systolic or if SBP is less than 100 or if lightheadedness develops. They were counseled to bring in all blood pressure readings and medications next visit.\par * The patient has been counseled that regular office followup (at least every 1 month for now)  is important for monitoring and for their health, and that it is their responsibility to make follow up appointments.\par * The patient also has been counseled that they must never stop or change any medications without discussing this with me (or another physician). \par \par # High renin.\par * No evidence for DAVID.\par * Follow BP on increased losartan. \par \par # DM.\par * Follow HGA1c. 
Yes

## 2024-11-14 NOTE — PATIENT PROFILE ADULT - NSPROMUTPARTICIPCAREFT_GEN_A_NUR
[FreeTextEntry1] :  mri left knee ZP 11/12/24 - MMT (undersurface and some radil component), synov, med parameniscal cyst 1cm   - We discussed their diagnosis and treatment options at length including the risks and benefits of both surgical and non-surgical options. Surgical risks include but are not limited to pain, infection, bleeding, vascular injury, numbness, tingling, nerve damage. - Given their active lifestyle along with pain and mechanical symptoms they are a surgical candidate. - The risks, benefits, and alternatives to left knee MMR vs PMM, synov, cyst excision were discussed with the patient, all questions were answered.      None